# Patient Record
Sex: MALE | Race: WHITE | HISPANIC OR LATINO | Employment: FULL TIME | URBAN - METROPOLITAN AREA
[De-identification: names, ages, dates, MRNs, and addresses within clinical notes are randomized per-mention and may not be internally consistent; named-entity substitution may affect disease eponyms.]

---

## 2023-01-23 NOTE — PROGRESS NOTES
Name: Cecy Villeda      : 1985      MRN: [id number]  Encounter Provider: JEN Wong  Encounter Date: 2023   Encounter department: [facility]     1  Overweight (BMI 25 0-29  9)  Assessment & Plan:  BMI Counseling: Body mass index is 29 kg/m²  The BMI is above normal  Nutrition recommendations include reducing portion sizes, decreasing overall calorie intake, 3-5 servings of fruits/vegetables daily, reducing fast food intake, consuming healthier snacks, decreasing soda and/or juice intake, moderation in carbohydrate intake, increasing intake of lean protein, reducing intake of saturated fat and trans fat and reducing intake of cholesterol  Exercise recommendations include moderate aerobic physical activity for 150 minutes/week  Orders:  -     Hemoglobin A1C; Future  -     Basic metabolic panel; Future  -     Lipid Panel with Direct LDL reflex; Future    2  Encounter for medical examination to establish care    3  Screening examination for STD (sexually transmitted disease)  -     Trichomonas Vaginalis, [facility]; Future  -     RPR; Future  -     Chlamydia/GC amplified DNA by PCR; Future  -     HIV 1/2 Antigen/Antibody (4th Generation) w Reflex [facility]; Future  -     Hepatitis panel, acute; Future    4  Skin tags, multiple acquired  Assessment & Plan:  - RTC for cryotherapy        Depression Screening and Follow-up Plan: Patient was screened for depression during today's encounter  They screened negative with a PHQ-2 score of 0  Subjective      Cecy Villeda is a 30 y o  male  has no past medical history on file  has no past surgical history on file  Pt presents today to establish care  He endorses 3 skin tags, present for 5-7 months and slowly growing  He has not tried anything for them  Review of Systems   Constitutional: Negative for chills and fever  HENT: Negative for ear pain and sore throat      Eyes: Negative for pain and visual disturbance  Respiratory: Negative for cough and shortness of breath  Cardiovascular: Negative for chest pain and palpitations  Gastrointestinal: Negative for abdominal pain and vomiting  Genitourinary: Negative for dysuria and hematuria  Musculoskeletal: Negative for arthralgias and back pain  Skin: Negative for color change and rash  (+) skin tags     Neurological: Negative for seizures and syncope  All other systems reviewed and are negative  No current outpatient medications on file prior to visit  Objective     /86 (BP Location: Left arm, Patient Position: Sitting, Cuff Size: Large)   Pulse 72   Temp 97 8 °F (36 6 °C) (Temporal)   Resp 18   Ht 6' 3" (1 905 m)   Wt 105 kg (232 lb)   SpO2 98%   BMI 29 00 kg/m²     Physical Exam  Vitals and nursing note reviewed  Constitutional:       General: He is not in acute distress  Appearance: He is not ill-appearing  HENT:      Head: Normocephalic and atraumatic  Right Ear: External ear normal  There is no impacted cerumen  Left Ear: External ear normal  There is no impacted cerumen  Nose: Nose normal  No congestion  Eyes:      Pupils: Pupils are equal, round, and reactive to light  Cardiovascular:      Rate and Rhythm: Normal rate and regular rhythm  Pulses: Normal pulses  Heart sounds: Normal heart sounds  No murmur heard  Pulmonary:      Effort: Pulmonary effort is normal       Breath sounds: Normal breath sounds  Abdominal:      General: Bowel sounds are normal       Palpations: Abdomen is soft  Tenderness: There is no abdominal tenderness  Musculoskeletal:         General: No tenderness  Normal range of motion  Cervical back: Normal range of motion  No tenderness  Skin:     General: Skin is warm and dry  Neurological:      General: No focal deficit present  Mental Status: He is alert and oriented to person, place, and time  Psychiatric:         Mood and Affect: Mood normal          Behavior: Behavior normal          Thought Content:  Thought content normal          Judgment: Judgment normal        Lisy Hernández

## 2023-01-24 ENCOUNTER — APPOINTMENT (OUTPATIENT)
Dept: LAB | Facility: CLINIC | Age: 38
End: 2023-01-24

## 2023-01-24 ENCOUNTER — OFFICE VISIT (OUTPATIENT)
Dept: FAMILY MEDICINE CLINIC | Facility: CLINIC | Age: 38
End: 2023-01-24

## 2023-01-24 VITALS
RESPIRATION RATE: 18 BRPM | HEIGHT: 75 IN | WEIGHT: 232 LBS | SYSTOLIC BLOOD PRESSURE: 132 MMHG | HEART RATE: 72 BPM | BODY MASS INDEX: 28.85 KG/M2 | OXYGEN SATURATION: 98 % | TEMPERATURE: 97.8 F | DIASTOLIC BLOOD PRESSURE: 86 MMHG

## 2023-01-24 DIAGNOSIS — E66.3 OVERWEIGHT (BMI 25.0-29.9): Primary | ICD-10-CM

## 2023-01-24 DIAGNOSIS — Z11.3 SCREENING EXAMINATION FOR STD (SEXUALLY TRANSMITTED DISEASE): ICD-10-CM

## 2023-01-24 DIAGNOSIS — E66.3 OVERWEIGHT (BMI 25.0-29.9): ICD-10-CM

## 2023-01-24 DIAGNOSIS — Z00.00 ENCOUNTER FOR MEDICAL EXAMINATION TO ESTABLISH CARE: ICD-10-CM

## 2023-01-24 DIAGNOSIS — L91.8 SKIN TAGS, MULTIPLE ACQUIRED: ICD-10-CM

## 2023-01-24 LAB
ANION GAP SERPL CALCULATED.3IONS-SCNC: 4 MMOL/L (ref 4–13)
BUN SERPL-MCNC: 16 MG/DL (ref 5–25)
CALCIUM SERPL-MCNC: 9.7 MG/DL (ref 8.3–10.1)
CHLORIDE SERPL-SCNC: 105 MMOL/L (ref 96–108)
CHOLEST SERPL-MCNC: 150 MG/DL
CO2 SERPL-SCNC: 28 MMOL/L (ref 21–32)
CREAT SERPL-MCNC: 1.16 MG/DL (ref 0.6–1.3)
GFR SERPL CREATININE-BSD FRML MDRD: 81 ML/MIN/1.73SQ M
GLUCOSE P FAST SERPL-MCNC: 78 MG/DL (ref 65–99)
HDLC SERPL-MCNC: 56 MG/DL
LDLC SERPL CALC-MCNC: 79 MG/DL (ref 0–100)
POTASSIUM SERPL-SCNC: 4.2 MMOL/L (ref 3.5–5.3)
SODIUM SERPL-SCNC: 137 MMOL/L (ref 135–147)
TRIGL SERPL-MCNC: 74 MG/DL

## 2023-01-24 NOTE — ASSESSMENT & PLAN NOTE
- RTC for cryotherapy Spoke with patient, let him know he was cleared for SCS from behavioral health. Also let him know that I would touch base with Dr Gibbs about medical clearance.  Pt scheduled for T-Spine MRI on 4/24/19- he will drop off the disk and the report after it is done for MD to look at.

## 2023-01-24 NOTE — ASSESSMENT & PLAN NOTE
BMI Counseling: Body mass index is 29 kg/m²  The BMI is above normal  Nutrition recommendations include reducing portion sizes, decreasing overall calorie intake, 3-5 servings of fruits/vegetables daily, reducing fast food intake, consuming healthier snacks, decreasing soda and/or juice intake, moderation in carbohydrate intake, increasing intake of lean protein, reducing intake of saturated fat and trans fat and reducing intake of cholesterol  Exercise recommendations include moderate aerobic physical activity for 150 minutes/week

## 2023-01-25 LAB
C TRACH DNA SPEC QL NAA+PROBE: NEGATIVE
EST. AVERAGE GLUCOSE BLD GHB EST-MCNC: 111 MG/DL
HAV IGM SER QL: NORMAL
HBA1C MFR BLD: 5.5 %
HBV CORE IGM SER QL: NORMAL
HBV SURFACE AG SER QL: NORMAL
HCV AB SER QL: NORMAL
HIV 1+2 AB+HIV1 P24 AG SERPL QL IA: NORMAL
HIV 2 AB SERPL QL IA: NORMAL
HIV1 AB SERPL QL IA: NORMAL
HIV1 P24 AG SERPL QL IA: NORMAL
N GONORRHOEA DNA SPEC QL NAA+PROBE: NEGATIVE
RPR SER QL: NORMAL

## 2023-01-26 LAB — T VAGINALIS RRNA SPEC QL NAA+PROBE: NEGATIVE

## 2023-02-03 ENCOUNTER — PROCEDURE VISIT (OUTPATIENT)
Dept: FAMILY MEDICINE CLINIC | Facility: CLINIC | Age: 38
End: 2023-02-03

## 2023-02-03 VITALS
RESPIRATION RATE: 16 BRPM | TEMPERATURE: 96.6 F | DIASTOLIC BLOOD PRESSURE: 78 MMHG | OXYGEN SATURATION: 97 % | HEART RATE: 72 BPM | BODY MASS INDEX: 28.97 KG/M2 | WEIGHT: 233 LBS | HEIGHT: 75 IN | SYSTOLIC BLOOD PRESSURE: 120 MMHG

## 2023-02-03 DIAGNOSIS — L91.8 SKIN TAG: ICD-10-CM

## 2023-02-03 DIAGNOSIS — L91.8 SKIN TAGS, MULTIPLE ACQUIRED: Primary | ICD-10-CM

## 2023-02-03 NOTE — PROGRESS NOTES
Name: Gera Rainey      : 1985      MRN: [id number]  Encounter Provider: Sally Pozo MD  Encounter Date: 2/3/2023   Encounter department: [place] [address]    Assessment & Plan     1  Skin tags, multiple acquired  Assessment & Plan:  Patient with 6-12 month history of skin tags that are irritating and bothersome  Removal of 4 skin tags preformed in clinic today  Patient tolerated procedure well  Will send for pathology  Orders:  -     Lesion Destruction    2  Skin tag  -     Tissue Exam  -     Tissue Exam         Subjective     This is a very pleasant 30 y o  male who presents to the clinic for removal of multiple skin tags  Patient reports he has had the skin tags for over 6 months and that he is bothered by both the appearance and tendency of the skin tags to snag on clothing and become inflamed  Patient denies any rashes or fever  Review of Systems   Constitutional: Negative for chills and fever  HENT: Negative for congestion, rhinorrhea and sinus pressure  Respiratory: Negative for chest tightness, shortness of breath and wheezing  Cardiovascular: Negative for chest pain and palpitations  Gastrointestinal: Negative for abdominal pain, nausea and vomiting  Endocrine: Negative for polyuria  Genitourinary: Negative for difficulty urinating, dysuria, frequency and urgency  Musculoskeletal: Negative for myalgias  Skin:        Multiple skin tags   Neurological: Negative for dizziness, syncope and light-headedness  Psychiatric/Behavioral: Negative for dysphoric mood  No past medical history on file  No past surgical history on file  No family history on file    Social History     Socioeconomic History   • Marital status: Single     Spouse name: None   • Number of children: None   • Years of education: None   • Highest education level: None   Occupational History   • None   Tobacco Use   • Smoking status: Former     Types: Cigarettes   • Smokeless tobacco: Never   Substance and Sexual Activity   • Alcohol use: Yes     Comment: social   • Drug use: Never   • Sexual activity: None   Other Topics Concern   • None   Social History Narrative   • None     Social Determinants of Health     Financial Resource Strain: Low Risk    • Difficulty of Paying Living Expenses: Not very hard   Food Insecurity: No Food Insecurity   • Worried About Running Out of Food in the Last Year: Never true   • Ran Out of Food in the Last Year: Never true   Transportation Needs: No Transportation Needs   • Lack of Transportation (Medical): No   • Lack of Transportation (Non-Medical): No   Physical Activity: Not on file   Stress: Not on file   Social Connections: Not on file   Intimate Partner Violence: Not on file   Housing Stability: Not on file     No current outpatient medications on file prior to visit  Allergies   Allergen Reactions   • Clindamycin Rash     Immunization History   Administered Date(s) Administered   • COVID-19 Pfizer vac (Sly-sucrose, gray cap) 12 yr+ IM 01/22/2022, 02/12/2022       Objective     /78 (BP Location: Left arm, Patient Position: Sitting, Cuff Size: Large)   Pulse 72   Temp (!) 96 6 °F (35 9 °C) (Temporal)   Resp 16   Ht 6' 3" (1 905 m)   Wt 106 kg (233 lb)   SpO2 97%   BMI 29 12 kg/m²       Physical Exam  Constitutional:       Appearance: Normal appearance  HENT:      Head: Normocephalic and atraumatic  Eyes:      Conjunctiva/sclera: Conjunctivae normal    Cardiovascular:      Rate and Rhythm: Normal rate and regular rhythm  Pulses: Normal pulses  Heart sounds: Normal heart sounds  Pulmonary:      Effort: Pulmonary effort is normal       Breath sounds: Normal breath sounds  Abdominal:      General: There is no distension  Musculoskeletal:         General: Normal range of motion  Cervical back: Normal range of motion and neck supple     Skin:     Findings: Lesion (multiple skin tags noted on LL abdominal quadrant, left inferior deltoid, left superior teres major, and serratus posterior ) present  Neurological:      Mental Status: He is alert and oriented to person, place, and time  Psychiatric:         Behavior: Behavior normal          Thought Content: Thought content normal        Lesion Destruction    Date/Time: 2/4/2023 3:50 PM  Performed by: Sally Pozo MD  Authorized by: Sally Pozo MD   Universal Protocol:  Procedure performed by:  Consent: Verbal consent obtained  Risks and benefits: risks, benefits and alternatives were discussed  Consent given by: patient  Time out: Immediately prior to procedure a "time out" was called to verify the correct patient, procedure, equipment, support staff and site/side marked as required    Timeout called at: 2/3/2023 11:00 AM   Patient understanding: patient states understanding of the procedure being performed  Patient consent: the patient's understanding of the procedure matches consent given  Procedure consent: procedure consent matches procedure scheduled  Relevant documents: relevant documents present and verified  Patient identity confirmed: verbally with patient      Procedure Details - Lesion Destruction:     Number of Lesions:  4  Lesion 1:     Body area:  Trunk    Trunk location:  Abdomen    Initial size (mm):  4 (4)    Malignancy: benign lesion      Destruction method: electrodesiccation and curettage      Destruction method comment:  Scissors and forceps used for extraction  Lesion 2:     Body area:  Trunk    Trunk location:  Back    Initial size (mm):  5    Malignancy: benign lesion      Destruction method: electrodesiccation and curettage      Destruction method comment:  Scissors and forceps used for extraction   Lesion 3:     Body area:  Trunk    Trunk location:  Back    Initial size (mm):  2    Malignancy: benign lesion      Destruction method: electrodesiccation and curettage      Destruction method comment:  Scissors and forceps used for extraction  Lesion 4:     Body area:  Trunk    Trunk location:  Back    Initial size (mm):  2    Malignancy: benign lesion      Destruction method: electrodesiccation and curettage      Destruction method comment:  Scissors and forceps used for extraction                      Robert Cueto MD

## 2023-02-04 NOTE — ASSESSMENT & PLAN NOTE
Patient with 6-12 month history of skin tags that are irritating and bothersome  Removal of 4 skin tags preformed in clinic today  Patient tolerated procedure well  Will send for pathology

## 2023-03-28 ENCOUNTER — TELEPHONE (OUTPATIENT)
Dept: UROLOGY | Facility: AMBULATORY SURGERY CENTER | Age: 38
End: 2023-03-28

## 2023-03-28 ENCOUNTER — OFFICE VISIT (OUTPATIENT)
Dept: FAMILY MEDICINE CLINIC | Facility: CLINIC | Age: 38
End: 2023-03-28

## 2023-03-28 VITALS
TEMPERATURE: 96.7 F | HEIGHT: 75 IN | DIASTOLIC BLOOD PRESSURE: 74 MMHG | HEART RATE: 62 BPM | RESPIRATION RATE: 19 BRPM | OXYGEN SATURATION: 98 % | WEIGHT: 230 LBS | BODY MASS INDEX: 28.6 KG/M2 | SYSTOLIC BLOOD PRESSURE: 134 MMHG

## 2023-03-28 DIAGNOSIS — Z48.816 ENCOUNTER FOR ASSESSMENT OF CIRCUMCISION: ICD-10-CM

## 2023-03-28 DIAGNOSIS — E66.3 OVERWEIGHT (BMI 25.0-29.9): ICD-10-CM

## 2023-03-28 DIAGNOSIS — M54.50 CHRONIC MIDLINE LOW BACK PAIN WITHOUT SCIATICA: Primary | ICD-10-CM

## 2023-03-28 DIAGNOSIS — G89.29 CHRONIC MIDLINE LOW BACK PAIN WITHOUT SCIATICA: Primary | ICD-10-CM

## 2023-03-28 RX ORDER — NAPROXEN 500 MG/1
250 TABLET ORAL 2 TIMES DAILY WITH MEALS
Qty: 90 TABLET | Refills: 0 | Status: CANCELLED | OUTPATIENT
Start: 2023-03-28

## 2023-03-28 RX ORDER — LIDOCAINE 50 MG/G
OINTMENT TOPICAL AS NEEDED
Qty: 35.44 G | Refills: 0 | Status: SHIPPED | OUTPATIENT
Start: 2023-03-28

## 2023-03-28 RX ORDER — SENNOSIDES 8.6 MG
650 CAPSULE ORAL EVERY 8 HOURS PRN
Qty: 30 TABLET | Refills: 0 | Status: SHIPPED | OUTPATIENT
Start: 2023-03-28

## 2023-03-28 NOTE — TELEPHONE ENCOUNTER
Please Triage  New Patient    What is the reason for the patient’s appointment? referral Z41 2 (ICD-10-CM) - Encounter for circumcision    What office location does the patient prefer? [place]     Imaging/Lab Results: no    Do we accept the patient's insurance or is the patient Self-Pay? Insurance Provider: [employer]  Plan Type/Number:  Member ID#: Has the patient had any previous Urologist(s)? no    Have patient records been requested? If not are records showing in Epic: no    Has the patient had any outside testing done? no    Does the patient have a personal history of cancer?  no

## 2023-03-28 NOTE — ASSESSMENT & PLAN NOTE
Body mass index is 28 75 kg/m²  The BMI is above normal  Nutrition recommendations include reducing portion sizes, decreasing overall calorie intake, 3-5 servings of fruits/vegetables daily, reducing fast food intake, consuming healthier snacks, decreasing soda and/or juice intake, moderation in carbohydrate intake, increasing intake of lean protein, reducing intake of saturated fat and trans fat and reducing intake of cholesterol  Exercise recommendations include moderate aerobic physical activity for 150 minutes/week

## 2023-03-28 NOTE — PROGRESS NOTES
Name: Elva Sanchez      : 1985      MRN: [id number]  Encounter Provider: JEN Hudson  Encounter Date: 3/28/2023   Encounter department: [address] [place] [address]     1  Chronic midline low back pain without sciatica  Assessment & Plan:  - Recommend PT, heat/ice application, home exercises  - May use tylenol 500 mg prn   - Advised pt to use naproxen prn if tylenol is ineffective  Orders:  -     Ambulatory Referral to Physical Therapy; Future  -     lidocaine (XYLOCAINE) 5 % ointment; Apply topically as needed for mild pain  -     acetaminophen (TYLENOL) 650 mg CR tablet; Take 1 tablet (650 mg total) by mouth every 8 (eight) hours as needed for mild pain    2  Encounter for assessment of circumcision  -     Ambulatory Referral to Urology; Future    3  Overweight (BMI 25 0-29  9)  Assessment & Plan: Body mass index is 28 75 kg/m²  The BMI is above normal  Nutrition recommendations include reducing portion sizes, decreasing overall calorie intake, 3-5 servings of fruits/vegetables daily, reducing fast food intake, consuming healthier snacks, decreasing soda and/or juice intake, moderation in carbohydrate intake, increasing intake of lean protein, reducing intake of saturated fat and trans fat and reducing intake of cholesterol  Exercise recommendations include moderate aerobic physical activity for 150 minutes/week  Subjective      Elva Sanchez is a 30 y o  male  has no past medical history on file  has no past surgical history on file  Patient presents for evaluation of low back problems  Patient reports taking robaxin PRN in the past which helped with the pain  Patient also states seeing physical therapy and have tried robaxin in the past for the same problem which alleviates with the pain  Symptoms have been present for 6 months but reports worsening in the past week  Pain is sharp in character; 9/10 in severity  "Symptoms are worse in the morning  Aggravating factors identifiable by the patient are walking, bending backwards, bending forwards and bending sideways  Allevating factors identifiable by the patient are sitting and standing  Patient denies urinary and gastrointestinal problems, fever, numbness/weakness on bilateral lower extremity  Patient is requesting referral for urology for circumcision  Review of Systems   Constitutional: Negative for chills and fever  HENT: Negative for ear pain and sore throat  Eyes: Negative for pain and visual disturbance  Respiratory: Negative for cough and shortness of breath  Cardiovascular: Negative for chest pain and palpitations  Gastrointestinal: Negative for abdominal pain and vomiting  Endocrine: Negative  Genitourinary: Negative for dysuria and hematuria  Musculoskeletal: Positive for arthralgias and back pain  Skin: Negative for color change and rash  Neurological: Negative for seizures and syncope  Psychiatric/Behavioral: Negative  All other systems reviewed and are negative  No current outpatient medications on file prior to visit  Objective     /74 (BP Location: Right arm, Patient Position: Sitting, Cuff Size: Large)   Pulse 62   Temp (!) 96 7 °F (35 9 °C) (Temporal)   Resp 19   Ht 6' 3\" (1 905 m)   Wt 104 kg (230 lb)   SpO2 98%   BMI 28 75 kg/m²     Physical Exam  Constitutional:       Appearance: Normal appearance  He is normal weight  HENT:      Head: Normocephalic and atraumatic  Right Ear: Tympanic membrane, ear canal and external ear normal       Left Ear: Tympanic membrane, ear canal and external ear normal       Nose: Nose normal       Mouth/Throat:      Mouth: Mucous membranes are moist    Eyes:      General:         Right eye: No discharge  Left eye: No discharge  Cardiovascular:      Rate and Rhythm: Normal rate and regular rhythm  Pulses: Normal pulses        Heart sounds: Normal " heart sounds  Pulmonary:      Effort: Pulmonary effort is normal       Breath sounds: Normal breath sounds  Abdominal:      General: Abdomen is flat  Bowel sounds are normal    Musculoskeletal:         General: Normal range of motion  Cervical back: Normal range of motion and neck supple  Lumbar back: Tenderness present  Right lower leg: No edema  Left lower leg: No edema  Skin:     General: Skin is warm and dry  Neurological:      General: No focal deficit present  Mental Status: He is alert and oriented to person, place, and time  Mental status is at baseline  Psychiatric:         Mood and Affect: Mood normal          Behavior: Behavior normal          Thought Content:  Thought content normal          Judgment: Judgment normal        Zina Mireles

## 2023-04-25 ENCOUNTER — OFFICE VISIT (OUTPATIENT)
Dept: UROLOGY | Facility: CLINIC | Age: 38
End: 2023-04-25

## 2023-04-25 VITALS
WEIGHT: 219 LBS | HEIGHT: 75 IN | HEART RATE: 60 BPM | DIASTOLIC BLOOD PRESSURE: 78 MMHG | BODY MASS INDEX: 27.23 KG/M2 | SYSTOLIC BLOOD PRESSURE: 120 MMHG

## 2023-04-25 DIAGNOSIS — Z48.816 ENCOUNTER FOR ASSESSMENT OF CIRCUMCISION: ICD-10-CM

## 2023-04-25 NOTE — PROGRESS NOTES
Assessment and plan:     Encounter for assessment of circumcision  · Mild phimosis  · OR Case requested  · Surgical consent obtained       JEN Babcock    History of Present Illness     Anup Armijo is a 41 y o  new patient who presents to have a circumcision  Denies any difficulty with pulling his foreskin back  He is doing this for personal reasons  Denies urinary issues  No testicular pain/swelling  Denies UTI/STI hx     Laboratory     Lab Results   Component Value Date    BUN 16 01/24/2023    CREATININE 1 16 01/24/2023       No components found for: GFR    Lab Results   Component Value Date    CALCIUM 9 7 01/24/2023    K 4 2 01/24/2023    CO2 28 01/24/2023     01/24/2023       No results found for: WBC, HGB, HCT, MCV, PLT    No results found for: PSA    No results found for this or any previous visit (from the past 1 hour(s))  @RESULT(URINEMICROSCOPIC)@    @RESULT(URINECULTURE)@    Radiology       Review of Systems     Review of Systems   Constitutional: Negative for activity change, appetite change, chills, fatigue, fever and unexpected weight change  HENT: Negative for facial swelling  Eyes: Negative for discharge  Respiratory: Negative  Negative for cough and shortness of breath  Cardiovascular: Negative for chest pain and leg swelling  Gastrointestinal: Negative  Negative for abdominal distention, abdominal pain, constipation, diarrhea, nausea and vomiting  Endocrine: Negative  Genitourinary: Negative  Negative for decreased urine volume, difficulty urinating, dysuria, enuresis, flank pain, frequency, genital sores, hematuria, penile pain, penile swelling, scrotal swelling, testicular pain and urgency  Musculoskeletal: Negative for back pain and myalgias  Skin: Negative for pallor and rash  Allergic/Immunologic: Negative  Negative for immunocompromised state  Neurological: Negative for facial asymmetry and speech difficulty  Psychiatric/Behavioral: Negative for agitation and confusion  Allergies     Allergies   Allergen Reactions   • Clindamycin Rash       Physical Exam     Physical Exam  Vitals reviewed  Constitutional:       General: He is not in acute distress  Appearance: Normal appearance  He is normal weight  He is not ill-appearing, toxic-appearing or diaphoretic  HENT:      Head: Normocephalic and atraumatic  Eyes:      General: No scleral icterus  Cardiovascular:      Rate and Rhythm: Normal rate and regular rhythm  Pulmonary:      Effort: Pulmonary effort is normal  No respiratory distress  Abdominal:      General: Abdomen is flat  There is no distension  Palpations: Abdomen is soft  Tenderness: There is no abdominal tenderness  There is no guarding or rebound  Genitourinary:     Penis: Uncircumcised  Phimosis present  No hypospadias, erythema, tenderness, discharge, swelling or lesions  Testes:         Right: Mass, tenderness or swelling not present  Right testis is descended  Left: Mass, tenderness or swelling not present  Left testis is descended  Epididymis:      Right: Not inflamed  No tenderness  Left: Not inflamed  No tenderness  Musculoskeletal:         General: No swelling  Cervical back: Normal range of motion  Right lower leg: No edema  Left lower leg: No edema  Lymphadenopathy:      Lower Body: No right inguinal adenopathy  No left inguinal adenopathy  Skin:     General: Skin is warm and dry  Coloration: Skin is not jaundiced or pale  Findings: No rash  Neurological:      General: No focal deficit present  Mental Status: He is alert and oriented to person, place, and time  Gait: Gait normal    Psychiatric:         Mood and Affect: Mood normal          Behavior: Behavior normal          Thought Content:  Thought content normal          Judgment: Judgment normal          Vital Signs     Vitals:    04/25/23 0743   BP: "120/78   BP Location: Left arm   Patient Position: Sitting   Cuff Size: Adult   Pulse: 60   Weight: 99 3 kg (219 lb)   Height: 6' 3\" (1 905 m)       Current Medications       Current Outpatient Medications:   •  acetaminophen (TYLENOL) 650 mg CR tablet, Take 1 tablet (650 mg total) by mouth every 8 (eight) hours as needed for mild pain, Disp: 30 tablet, Rfl: 0  •  lidocaine (XYLOCAINE) 5 % ointment, Apply topically as needed for mild pain, Disp: 35 44 g, Rfl: 0    Active Problems     Patient Active Problem List   Diagnosis   • Overweight (BMI 25 0-29  9)   • Skin tags, multiple acquired   • Chronic midline low back pain without sciatica   • Encounter for assessment of circumcision       Past Medical History     History reviewed  No pertinent past medical history  Surgical History     History reviewed  No pertinent surgical history  Family History     History reviewed  No pertinent family history  Social History     Social History     Social History     Tobacco Use   Smoking Status Former   • Types: Cigarettes   Smokeless Tobacco Never       History reviewed  No pertinent surgical history  The following portions of the patient's history were reviewed and updated as appropriate: allergies, current medications, past family history, past medical history, past social history, past surgical history and problem list    Please note :  Voice dictation software has been used to create this document  There may be inadvertent transcription errors      Brittney Babin"

## 2023-04-25 NOTE — PATIENT INSTRUCTIONS
Circuncisión masculina en adultos   LO QUE NECESITA SABER:   La circuncisión es un procedimiento para quitar el prepucio del pene  El prepucio es el pliegue de piel que cubre la punta del pene  Dias dolor no mejora en un periodo de 3 días  Sin embargo, dias pene puede estar inflamado maykel [employer]  Dominga Mike [facility]:   Llame a dias médico si:  Tiene fiebre  Hay nuevo sangrado  El dolor se agrava o no mejora en pocos fátima  Tiene dificultad para orinar  Se desprenden los puntos de sutura  Usted tiene preguntas o inquietudes acerca de dias condición o cuidado  Medicamentos:  MAXIMUS pueden disminuir la inflamación y el dolor o la fiebre  Felix medicamento está disponible con o sin sanya receta médica  Los MAXIMUS pueden causar sangrado estomacal o problemas renales en ciertas personas  Si usted delvis un medicamento anticoagulante, siempre pregúntele a dias médico si los MAXIMUS son seguros para usted  Siempre elodia la etiqueta de felix medicamento y [place] instrucciones  Acetaminofén [employer] dolor y baja la fiebre  Está disponible sin receta médica  Pregunte la cantidad y la frecuencia con que debe tomarlos  [address]  Elodia las etiquetas de todos los demás medicamentos que esté usando para saber si también contienen acetaminofén, o pregunte a dias médico o farmacéutico  El acetaminofén puede causar daño en el hígado cuando no se delvis de forma correcta  [place] david medicamentos yamilet se le haya indicado  Consulte con dias médico si usted john que dias medicamento no le está ayudando o si presenta efectos secundarios  Infórmele al médico si usted es alérgico a algún medicamento  Mantenga sanya lista actualizada de los [place], las vitaminas y los productos herbales que delvis  Incluya los siguientes datos de los medicamentos: cantidad, frecuencia y motivo de administración  Traiga con usted la lista o los envases de las píldoras a david citas de seguimiento   Lleve la [employer] de los medicamentos con usted en manuel de sanya emergencia  Cuidado del vendaje de la circuncisión: Retire el vendaje de dias pene mojándolo en la bañera  Llene sanya bañera limpia por sobre la altura de la cadera con agua limpia tibia  Siéntese en la bañera hasta que el vendaje se salga fácilmente  Cuidado personal: Puede tardar 6 semanas o más para que se cure completamente  Coloque hielo en la jay  Se debe colocar hielo maykel 20 minutos y luego quitarlo otros 20 minutos  Repita esto para las primeras 24 horas después de dias procedimiento  Use sanya compresa de hielo o ponga hielo triturado en sanya bolsa de plástico  Auther [employer]ding con sanya toalla antes de aplicarlo sobre la jay  El hielo ayudará a aliviar el dolor y bajar la inflamación  Descanse  No corra ni practique deportes hasta que dias médico se lo autorice  No tenga relaciones sexuales maykel 6 semanas, o hasta que dias médico lo autorice  Acuda a david consultas de control con dias médico según le indicaron  Anote david preguntas para que se acuerde de hacerlas maykel david visitas  © Copyright Robyne Russian 2022 Information is for End User's use only and may not be sold, redistributed or otherwise used for commercial purposes  Esta información es sólo para uso en educación  Dias intención no es darle un consejo médico sobre enfermedades o tratamientos  Colsulte con dias Earnesteen Sergeant farmacéutico antes de seguir cualquier régimen médico para saber si es seguro y efectivo para usted  Circuncisión masculina en adultos   LO QUE NECESITA SABER:   ¿Qué necesito saber acerca de sanya circuncisión? La circuncisión es un procedimiento para quitar el prepucio del pene  El prepucio es el pliegue de piel que cubre la punta del pene  ¿Cómo me preparo para sanya circuncisión? Dias médico le indicará cómo prepararse para felix procedimiento  Rosezetta Banco qué medicamentos puede armand y cuáles no debe armand antes del procedimiento   Es posible que le indiquen que no coma ni tome nada pasada la medianoche de la noche previa al procedimiento  [place] sanya ducha la mañana de dias procedimiento  No se ponga en loción  [place] a alguien que lo lleve a casa y se quede con usted por un día después de dias procedimiento  ¿Qué sucederá maykel el procedimiento? Dias médico le dará sanya inyección de anestésico en la base del pene  Dias médico puede darle otra inyección más arriba en dias pene  El medicamento evitará que usted sienta dolor maykel el procedimiento, caroline estará despierto  O también es posible que le administren anestesia general para mantenerlo dormido y sin dolor maykel el procedimiento  El médico hará sanya incisión y cortará el prepucio  Dias médico puede cerrar los bordes con pegamento para tejidos o con puntos de sutura que se disolverán  Se colocará vaselina y un vendaje en el área  ¿Qué sucederá después del procedimiento? Será monitoreado hasta que esté estable  Cuando esté estable, podrá regresar a dias hogar  Usted necesitará descansar maykel el claude del día  Dias pene estará inflamado y amoratado  Tendrá algo de dolor después de que desaparezca el efecto de la anestesia  [employer] debe mejorar en los siguientes 2 o 3 fátima  ¿Cuáles son los riesgos de la circuncisión? Usted podría sangrar más de lo esperado o contraer sanya infección  Podría ocurrir sanya [employer]  pene, la uretra o los nervios  Es posible que usted necesite otro procedimiento para arreglar la lesión  ACUERDOS SOBRE DIAS CUIDADO:   Usted tiene el derecho de ayudar a planear dias cuidado  Aprenda todo lo que pueda sobre dias condición y yamilet darle tratamiento  Discuta david opciones de tratamiento con david médicos para decidir el cuidado que usted desea recibir  Usted siempre tiene el derecho de rechazar el tratamiento  Esta información es sólo para uso en educación  Dias intención no es darle un consejo médico sobre enfermedades o tratamientos   Colsulte con dias Jackie Pace farmacéutico antes de seguir cualquier régimen médico para saber si es seguro y efectivo para usted  © Copyright Elizebe Glenna 2022 Information is for End User's use only and may not be sold, redistributed or otherwise used for commercial purposes

## 2023-05-05 ENCOUNTER — TELEPHONE (OUTPATIENT)
Dept: UROLOGY | Facility: CLINIC | Age: 38
End: 2023-05-05

## 2023-05-05 ENCOUNTER — PREP FOR PROCEDURE (OUTPATIENT)
Dept: UROLOGY | Facility: CLINIC | Age: 38
End: 2023-05-05

## 2023-05-05 DIAGNOSIS — Z01.818 ENCOUNTER FOR PREADMISSION TESTING: Primary | ICD-10-CM

## 2023-05-05 NOTE — TELEPHONE ENCOUNTER
PT called with  and stated 5/19/23 will actually work please call pt with  to see if date is still available     Pt call [phone]

## 2023-05-05 NOTE — TELEPHONE ENCOUNTER
With , lmom to c/b for scheduling OR procedure  Have 5/19 available with Dr Blanca Rooney on 5/19 at [facility]  Will email all instructions when pt confirms date  Pt will also need a postop appt scheduled for 2 weeks with AP

## 2023-05-05 NOTE — TELEPHONE ENCOUNTER
Returned call with   Scheduled OR for 5/19 with Dr Lyn Paniagua at [facility][1]  F/U scheduled for 5/31  Emailed all OR instructions per pt request  Mark Conrad to call office with any questions

## 2023-05-10 NOTE — H&P
H&P Exam - Urology   Esvin Tavares 41 y o  male MRN: [id number]  Unit/Bed#:  Encounter: [id number]    Assessment/Plan     Assessment:  Phimosis  Plan:  Circumcision    History of Present Illness   HPI:  Esvin Tavares is a 41 y o  male who presents with mild phimosis although the patient notes no difficulty pulling his foreskin back when he voids  The patient presented requesting elective circumcision  I met the patient in the holding area and explained the elective nature of this procedure  We discussed pros and cons of circumcision through a  and the patient is aware of the potential side effects of bleeding infection contracture dehiscence or need for other operative interventions  Meatal stenosis was also discussed  The patient agreed to the procedure and reaffirmed previously signed informed consent verbally  Review of Systems    Historical Information   No past medical history on file  No past surgical history on file  Social History   Social History     Substance and Sexual Activity   Alcohol Use Yes    Comment: social     Social History     Substance and Sexual Activity   Drug Use Never     Social History     Tobacco Use   Smoking Status Former   • Types: Cigarettes   Smokeless Tobacco Never     Family History: No family history on file  Meds/Allergies   all medications and allergies reviewed  Allergies   Allergen Reactions   • Clindamycin Rash       Objective   Vitals: There were no vitals taken for this visit  No intake/output data recorded  Invasive Devices     None                 Physical Exam  Vitals reviewed  Constitutional:       General: He is not in acute distress  Appearance: Normal appearance  He is normal weight  He is not ill-appearing, toxic-appearing or diaphoretic  HENT:      Head: Normocephalic and atraumatic        Nose: Nose normal       Mouth/Throat:      Mouth: Mucous membranes are moist    Eyes:      Extraocular Movements: Extraocular movements intact  Cardiovascular:      Rate and Rhythm: Normal rate and regular rhythm  Heart sounds: Normal heart sounds  Pulmonary:      Effort: Pulmonary effort is normal  No respiratory distress  Breath sounds: No wheezing, rhonchi or rales  Abdominal:      General: Bowel sounds are normal  There is no distension  Palpations: Abdomen is soft  Tenderness: There is no abdominal tenderness  There is no guarding  Genitourinary:     Penis: Normal        Comments: Minimal phimosis  Musculoskeletal:         General: Normal range of motion  Cervical back: Normal range of motion and neck supple  Neurological:      Mental Status: He is alert and oriented to person, place, and time  Psychiatric:         Mood and Affect: Mood normal          Behavior: Behavior normal          Thought Content: Thought content normal          Judgment: Judgment normal          Lab Results: I have personally reviewed pertinent reports  Imaging: I have personally reviewed pertinent reports  EKG, Pathology, and Other Studies: I have personally reviewed pertinent reports  VTE Prophylaxis: Sequential compression device Hiro Hopson)     Code Status: No Order  Advance Directive and Living Will:      Power of :    POLST:      Counseling / Coordination of Care  Total floor / unit time spent today 30 minutes  Greater than 50% of total time was spent with the patient and / or family counseling and / or coordination of care  A description of the counseling / coordination of care: Roscoe Herbert

## 2023-05-10 NOTE — DISCHARGE INSTRUCTIONS
Circuncisión masculina en adultos   LO QUE NECESITA SABER:   La circuncisión es un procedimiento para quitar el prepucio del pene  El prepucio es el pliegue de piel que cubre la punta del pene  Dias dolor no mejora en un periodo de 3 días  Sin embargo, dias pene puede estar inflamado maykel [employer]  [place]ur[place] Vince [facility]:   Llame a dias médico si:  Tiene fiebre  Hay nuevo sangrado  El dolor se agrava o no mejora en pocos fátima  Tiene dificultad para orinar  Se desprenden los puntos de sutura  Usted tiene preguntas o inquietudes acerca de dias condición o cuidado  Medicamentos:  MAXIMUS pueden disminuir la inflamación y el dolor o la fiebre  Shanna medicamento está disponible con o sin sanya receta médica  Los MAXIMUS pueden causar sangrado estomacal o problemas renales en ciertas personas  Si usted delvis un medicamento anticoagulante, siempre pregúntele a dias médico si los MAXIMUS son seguros para usted  Siempre elodia la etiqueta de shanna medicamento y [place] instrucciones  Acetaminofén [employer] dolor y baja la fiebre  Está disponible sin receta médica  Pregunte la cantidad y la frecuencia con que debe tomarlos  [address]  Elodia las etiquetas de todos los demás medicamentos que esté usando para saber si también contienen acetaminofén, o pregunte a dias médico o farmacéutico  El acetaminofén puede causar daño en el hígado cuando no se delvis de forma correcta  [place] david medicamentos yamilet se le haya indicado  Consulte con dias médico si usted john que dias medicamento no le está ayudando o si presenta efectos secundarios  Infórmele al médico si usted es alérgico a algún medicamento  Mantenga sanya lista actualizada de los [place], las vitaminas y los productos herbales que delvis  Incluya los siguientes datos de los medicamentos: cantidad, frecuencia y motivo de administración  Traiga con usted la lista o los envases de las píldoras a david citas de seguimiento   Lleve la [employer] de los medicamentos con usted en manuel de sanya emergencia  Cuidado del vendaje de la circuncisión: Retire el vendaje de dias pene mojándolo en la bañera  Llene sanya bañera limpia por sobre la altura de la cadera con agua limpia tibia  Siéntese en la bañera hasta que el vendaje se salga fácilmente  Cuidado personal: Puede tardar 6 semanas o más para que se cure completamente  Coloque hielo en la jay  Se debe colocar hielo maykel 20 minutos y luego quitarlo otros 20 minutos  Repita esto para las primeras 24 horas después de dias procedimiento  Use sanya compresa de hielo o ponga hielo triturado en sanya bolsa de plástico  Assumption Manger con sanya toalla antes de aplicarlo sobre la jay  El hielo ayudará a aliviar el dolor y bajar la inflamación  Descanse  No corra ni practique deportes hasta que dias médico se lo autorice  No tenga relaciones sexuales maykel 6 semanas, o hasta que dias médico lo autorice  Acuda a david consultas de control con dias médico según le indicaron  Anote david preguntas para que se acuerde de hacerlas maykel david visitas  © Copyright Winsome Liv 2022 Information is for End User's use only and may not be sold, redistributed or otherwise used for commercial purposes  Esta información es sólo para uso en educación  Dias intención no es darle un consejo médico sobre enfermedades o tratamientos  Colsulte con dias Addison Sender farmacéutico antes de seguir cualquier régimen médico para saber si es seguro y efectivo para usted

## 2023-05-12 ENCOUNTER — APPOINTMENT (OUTPATIENT)
Dept: LAB | Facility: HOSPITAL | Age: 38
End: 2023-05-12

## 2023-05-12 DIAGNOSIS — Z01.818 ENCOUNTER FOR PREADMISSION TESTING: ICD-10-CM

## 2023-05-12 LAB
ANION GAP SERPL CALCULATED.3IONS-SCNC: 6 MMOL/L (ref 4–13)
BASOPHILS # BLD AUTO: 0.07 THOUSANDS/ÂΜL (ref 0–0.1)
BASOPHILS NFR BLD AUTO: 1 % (ref 0–1)
BUN SERPL-MCNC: 19 MG/DL (ref 5–25)
CALCIUM SERPL-MCNC: 9.1 MG/DL (ref 8.4–10.2)
CHLORIDE SERPL-SCNC: 106 MMOL/L (ref 96–108)
CO2 SERPL-SCNC: 27 MMOL/L (ref 21–32)
CREAT SERPL-MCNC: 1.08 MG/DL (ref 0.6–1.3)
EOSINOPHIL # BLD AUTO: 0.26 THOUSAND/ÂΜL (ref 0–0.61)
EOSINOPHIL NFR BLD AUTO: 3 % (ref 0–6)
ERYTHROCYTE [DISTWIDTH] IN BLOOD BY AUTOMATED COUNT: 12.7 % (ref 11.6–15.1)
GFR SERPL CREATININE-BSD FRML MDRD: 87 ML/MIN/1.73SQ M
GLUCOSE P FAST SERPL-MCNC: 78 MG/DL (ref 65–99)
HCT VFR BLD AUTO: 46.9 % (ref 36.5–49.3)
HGB BLD-MCNC: 15.2 G/DL (ref 12–17)
IMM GRANULOCYTES # BLD AUTO: 0.03 THOUSAND/UL (ref 0–0.2)
IMM GRANULOCYTES NFR BLD AUTO: 0 % (ref 0–2)
LYMPHOCYTES # BLD AUTO: 3.4 THOUSANDS/ÂΜL (ref 0.6–4.47)
LYMPHOCYTES NFR BLD AUTO: 37 % (ref 14–44)
MCH RBC QN AUTO: 27.8 PG (ref 26.8–34.3)
MCHC RBC AUTO-ENTMCNC: 32.4 G/DL (ref 31.4–37.4)
MCV RBC AUTO: 86 FL (ref 82–98)
MONOCYTES # BLD AUTO: 0.62 THOUSAND/ÂΜL (ref 0.17–1.22)
MONOCYTES NFR BLD AUTO: 7 % (ref 4–12)
NEUTROPHILS # BLD AUTO: 4.83 THOUSANDS/ÂΜL (ref 1.85–7.62)
NEUTS SEG NFR BLD AUTO: 52 % (ref 43–75)
NRBC BLD AUTO-RTO: 0 /100 WBCS
PLATELET # BLD AUTO: 269 THOUSANDS/UL (ref 149–390)
PMV BLD AUTO: 11.4 FL (ref 8.9–12.7)
POTASSIUM SERPL-SCNC: 3.9 MMOL/L (ref 3.5–5.3)
RBC # BLD AUTO: 5.47 MILLION/UL (ref 3.88–5.62)
SODIUM SERPL-SCNC: 139 MMOL/L (ref 135–147)
WBC # BLD AUTO: 9.21 THOUSAND/UL (ref 4.31–10.16)

## 2023-05-16 NOTE — PRE-PROCEDURE INSTRUCTIONS
Pre-Surgery Instructions:   Medication Instructions   • acetaminophen (TYLENOL) 650 mg CR tablet PRN      Medication instructions for day surgery reviewed  Please use only a sip of water to take your instructed medications  Avoid all over the counter vitamins, supplements and NSAIDS for one week prior to surgery per anesthesia guidelines  Tylenol is ok to take as needed  You will receive a call one business day prior to surgery with an arrival time and hospital directions  If your surgery is scheduled on a Monday, the hospital will be calling you on the Friday prior to your surgery  If you have not heard from anyone by 8pm, please call the hospital supervisor through the hospital  at [phone]  Erin Hurley [phone])  Do not eat or drink anything after midnight the night before your surgery, including candy, mints, lifesavers, or chewing gum  Do not drink alcohol 24hrs before your surgery  Try not to smoke at least 24hrs before your surgery  Follow the pre surgery showering instructions as listed in the [facility] Surgical Experience Booklet” or otherwise provided by your surgeon's office  Do not shave the surgical area 24 hours before surgery  Do not apply any lotions, creams, including makeup, cologne, deodorant, or perfumes after showering on the day of your surgery  No contact lenses, eye make-up, or artificial eyelashes  Remove nail polish, including gel polish, and any artificial, gel, or acrylic nails if possible  Remove all jewelry including rings and body piercing jewelry  Wear causal clothing that is easy to take on and off  Consider your type of surgery  Keep any valuables, jewelry, piercings at home  Please bring any specially ordered equipment (sling, braces) if indicated  Arrange for a responsible person to drive you to and from the hospital on the day of your surgery  Visitor Guidelines discussed       Call the surgeon's office with any new illnesses, exposures, or additional questions prior to surgery  Please reference your [facility] Surgical Experience Booklet” for additional information to prepare for your upcoming surgery

## 2023-05-17 ENCOUNTER — ANESTHESIA EVENT (OUTPATIENT)
Dept: PERIOP | Facility: HOSPITAL | Age: 38
End: 2023-05-17

## 2023-05-19 ENCOUNTER — HOSPITAL ENCOUNTER (OUTPATIENT)
Facility: HOSPITAL | Age: 38
Setting detail: OUTPATIENT SURGERY
Discharge: HOME | End: 2023-05-19
Attending: UROLOGY | Admitting: UROLOGY

## 2023-05-19 ENCOUNTER — ANESTHESIA (OUTPATIENT)
Dept: PERIOP | Facility: HOSPITAL | Age: 38
End: 2023-05-19

## 2023-05-19 VITALS
BODY MASS INDEX: 27.8 KG/M2 | RESPIRATION RATE: 16 BRPM | OXYGEN SATURATION: 99 % | DIASTOLIC BLOOD PRESSURE: 65 MMHG | SYSTOLIC BLOOD PRESSURE: 120 MMHG | HEART RATE: 60 BPM | WEIGHT: 222.44 LBS | TEMPERATURE: 97.7 F

## 2023-05-19 DIAGNOSIS — Z48.816 ENCOUNTER FOR ASSESSMENT OF CIRCUMCISION: Primary | ICD-10-CM

## 2023-05-19 RX ORDER — ONDANSETRON 2 MG/ML
INJECTION INTRAMUSCULAR; INTRAVENOUS AS NEEDED
Status: DISCONTINUED | OUTPATIENT
Start: 2023-05-19 | End: 2023-05-19

## 2023-05-19 RX ORDER — HYDROMORPHONE HCL/PF 1 MG/ML
0.5 SYRINGE (ML) INJECTION
Status: DISCONTINUED | OUTPATIENT
Start: 2023-05-19 | End: 2023-05-19 | Stop reason: HOSPADM

## 2023-05-19 RX ORDER — CEFAZOLIN SODIUM 2 G/50ML
2000 SOLUTION INTRAVENOUS ONCE
Status: COMPLETED | OUTPATIENT
Start: 2023-05-19 | End: 2023-05-19

## 2023-05-19 RX ORDER — GLYCOPYRROLATE 0.2 MG/ML
INJECTION INTRAMUSCULAR; INTRAVENOUS AS NEEDED
Status: DISCONTINUED | OUTPATIENT
Start: 2023-05-19 | End: 2023-05-19

## 2023-05-19 RX ORDER — PROPOFOL 10 MG/ML
INJECTION, EMULSION INTRAVENOUS AS NEEDED
Status: DISCONTINUED | OUTPATIENT
Start: 2023-05-19 | End: 2023-05-19

## 2023-05-19 RX ORDER — LIDOCAINE HYDROCHLORIDE 20 MG/ML
INJECTION, SOLUTION EPIDURAL; INFILTRATION; INTRACAUDAL; PERINEURAL AS NEEDED
Status: DISCONTINUED | OUTPATIENT
Start: 2023-05-19 | End: 2023-05-19

## 2023-05-19 RX ORDER — DEXAMETHASONE SODIUM PHOSPHATE 10 MG/ML
INJECTION, SOLUTION INTRAMUSCULAR; INTRAVENOUS AS NEEDED
Status: DISCONTINUED | OUTPATIENT
Start: 2023-05-19 | End: 2023-05-19

## 2023-05-19 RX ORDER — MIDAZOLAM HYDROCHLORIDE 2 MG/2ML
INJECTION, SOLUTION INTRAMUSCULAR; INTRAVENOUS AS NEEDED
Status: DISCONTINUED | OUTPATIENT
Start: 2023-05-19 | End: 2023-05-19

## 2023-05-19 RX ORDER — HYDROCODONE BITARTRATE AND ACETAMINOPHEN 5; 325 MG/1; MG/1
1 TABLET ORAL EVERY 6 HOURS PRN
Qty: 12 TABLET | Refills: 0 | Status: SHIPPED | OUTPATIENT
Start: 2023-05-19

## 2023-05-19 RX ORDER — MAGNESIUM HYDROXIDE 1200 MG/15ML
LIQUID ORAL AS NEEDED
Status: DISCONTINUED | OUTPATIENT
Start: 2023-05-19 | End: 2023-05-19 | Stop reason: HOSPADM

## 2023-05-19 RX ORDER — FENTANYL CITRATE 50 UG/ML
INJECTION, SOLUTION INTRAMUSCULAR; INTRAVENOUS AS NEEDED
Status: DISCONTINUED | OUTPATIENT
Start: 2023-05-19 | End: 2023-05-19

## 2023-05-19 RX ORDER — SODIUM CHLORIDE, SODIUM LACTATE, POTASSIUM CHLORIDE, CALCIUM CHLORIDE 600; 310; 30; 20 MG/100ML; MG/100ML; MG/100ML; MG/100ML
125 INJECTION, SOLUTION INTRAVENOUS CONTINUOUS
Status: DISCONTINUED | OUTPATIENT
Start: 2023-05-19 | End: 2023-05-19 | Stop reason: HOSPADM

## 2023-05-19 RX ORDER — BUPIVACAINE HYDROCHLORIDE 5 MG/ML
INJECTION, SOLUTION EPIDURAL; INTRACAUDAL AS NEEDED
Status: DISCONTINUED | OUTPATIENT
Start: 2023-05-19 | End: 2023-05-19 | Stop reason: HOSPADM

## 2023-05-19 RX ORDER — FENTANYL CITRATE/PF 50 MCG/ML
25 SYRINGE (ML) INJECTION
Status: DISCONTINUED | OUTPATIENT
Start: 2023-05-19 | End: 2023-05-19 | Stop reason: HOSPADM

## 2023-05-19 RX ORDER — HYDROCODONE BITARTRATE AND ACETAMINOPHEN 5; 325 MG/1; MG/1
1 TABLET ORAL EVERY 6 HOURS PRN
Status: DISCONTINUED | OUTPATIENT
Start: 2023-05-19 | End: 2023-05-19 | Stop reason: HOSPADM

## 2023-05-19 RX ORDER — MEPERIDINE HYDROCHLORIDE 25 MG/ML
12.5 INJECTION INTRAMUSCULAR; INTRAVENOUS; SUBCUTANEOUS
Status: DISCONTINUED | OUTPATIENT
Start: 2023-05-19 | End: 2023-05-19 | Stop reason: HOSPADM

## 2023-05-19 RX ORDER — LIDOCAINE HYDROCHLORIDE 20 MG/ML
INJECTION, SOLUTION INFILTRATION; PERINEURAL AS NEEDED
Status: DISCONTINUED | OUTPATIENT
Start: 2023-05-19 | End: 2023-05-19 | Stop reason: HOSPADM

## 2023-05-19 RX ORDER — ONDANSETRON 2 MG/ML
4 INJECTION INTRAMUSCULAR; INTRAVENOUS ONCE AS NEEDED
Status: DISCONTINUED | OUTPATIENT
Start: 2023-05-19 | End: 2023-05-19 | Stop reason: HOSPADM

## 2023-05-19 RX ORDER — KETOROLAC TROMETHAMINE 30 MG/ML
INJECTION, SOLUTION INTRAMUSCULAR; INTRAVENOUS AS NEEDED
Status: DISCONTINUED | OUTPATIENT
Start: 2023-05-19 | End: 2023-05-19

## 2023-05-19 RX ADMIN — CEFAZOLIN SODIUM 2000 MG: 2 SOLUTION INTRAVENOUS at 07:25

## 2023-05-19 RX ADMIN — SODIUM CHLORIDE, SODIUM LACTATE, POTASSIUM CHLORIDE, AND CALCIUM CHLORIDE 125 ML/HR: .6; .31; .03; .02 INJECTION, SOLUTION INTRAVENOUS at 06:32

## 2023-05-19 RX ADMIN — MIDAZOLAM 2 MG: 1 INJECTION INTRAMUSCULAR; INTRAVENOUS at 07:28

## 2023-05-19 RX ADMIN — LIDOCAINE HYDROCHLORIDE 100 MG: 20 INJECTION, SOLUTION EPIDURAL; INFILTRATION; INTRACAUDAL at 07:36

## 2023-05-19 RX ADMIN — DEXAMETHASONE SODIUM PHOSPHATE 10 MG: 10 INJECTION INTRAMUSCULAR; INTRAVENOUS at 07:40

## 2023-05-19 RX ADMIN — FENTANYL CITRATE 50 MCG: 50 INJECTION INTRAMUSCULAR; INTRAVENOUS at 07:40

## 2023-05-19 RX ADMIN — ONDANSETRON 4 MG: 2 INJECTION INTRAMUSCULAR; INTRAVENOUS at 07:53

## 2023-05-19 RX ADMIN — PROPOFOL 300 MG: 10 INJECTION, EMULSION INTRAVENOUS at 07:36

## 2023-05-19 RX ADMIN — GLYCOPYRROLATE 0.2 MG: 0.2 INJECTION INTRAMUSCULAR; INTRAVENOUS at 07:41

## 2023-05-19 RX ADMIN — FENTANYL CITRATE 50 MCG: 50 INJECTION INTRAMUSCULAR; INTRAVENOUS at 07:49

## 2023-05-19 RX ADMIN — SODIUM CHLORIDE, SODIUM LACTATE, POTASSIUM CHLORIDE, AND CALCIUM CHLORIDE: .6; .31; .03; .02 INJECTION, SOLUTION INTRAVENOUS at 08:08

## 2023-05-19 RX ADMIN — KETOROLAC TROMETHAMINE 30 MG: 30 INJECTION, SOLUTION INTRAMUSCULAR at 08:16

## 2023-05-19 NOTE — ANESTHESIA POSTPROCEDURE EVALUATION
Post-Op Assessment Note    CV Status:  Stable    Pain management: adequate     Mental Status:  Alert and awake   Hydration Status:  Euvolemic   PONV Controlled:  Controlled   Airway Patency:  Patent      Post Op Vitals Reviewed: Yes      Staff: Anesthesiologist, CRNA         No notable events documented      /78 (05/19/23 0903)    Temp (!) 97 3 °F (36 3 °C) (05/19/23 0903)    Pulse 58 (05/19/23 0903)   Resp 16 (05/19/23 0903)    SpO2 100 % (05/19/23 0903)

## 2023-05-19 NOTE — ANESTHESIA PREPROCEDURE EVALUATION
Procedure:  CIRCUMCISION ADULT (Penis)    Relevant Problems   ANESTHESIA (within normal limits)      CARDIO (within normal limits)      GI/HEPATIC (within normal limits)      MUSCULOSKELETAL   (+) Chronic midline low back pain without sciatica      NEURO/PSYCH   (+) Chronic midline low back pain without sciatica      PULMONARY (within normal limits)        Physical Exam    Airway    Mallampati score: I  TM Distance: >3 FB  Neck ROM: full     Dental       Cardiovascular  Cardiovascular exam normal    Pulmonary  Pulmonary exam normal     Other Findings        Anesthesia Plan  ASA Score- 2     Anesthesia Type- general with ASA Monitors  Additional Monitors:   Airway Plan: LMA  Plan Factors-    Chart reviewed  Existing labs reviewed  Patient summary reviewed  Induction- intravenous  Postoperative Plan-     Informed Consent- Anesthetic plan and risks discussed with patient

## 2023-05-19 NOTE — INTERVAL H&P NOTE
H&P reviewed  After examining the patient I find no changes in the patients condition since the H&P had been written      Vitals:    05/19/23 0614   BP: 115/70   Pulse: 56   Resp: 16   Temp: 98 2 °F (36 8 °C)   SpO2: 98%

## 2023-05-19 NOTE — OP NOTE
OPERATIVE REPORT  PATIENT NAME: Soraya Mccauley    :  1985  MRN: [id number]  Pt Location: [facility] OR ROOM 07    SURGERY DATE: 2023    Surgeon(s) and Role:     * Zeyad Mathews MD - Primary    Preop Diagnosis:  Phimosis    Postoperative diagnosis:    Phimosis     Procedure(s):  CIRCUMCISION ADULT    Specimen(s):  ID Type Source Tests Collected by Time Destination   1 :  Tissue Foreskin TISSUE EXAM Zeyad Mathews MD 2023 0754        Estimated Blood Loss:   Minimal    Drains:  * No LDAs found *    Anesthesia Type:   LMA anesthesia with lidocaine 2% Marcaine 0 5% local penile block    Operative Indications:     Phimosis    Operative Findings:  See operative note    Complications:   None    Procedure and Technique: I saw the patient in the holding area and reviewed the proposed procedure step-by-step answered all questions reaffirmed previously performed history and physical discussed risks and complications  The patient expressed understanding  He was taken to the operating room identified by the surgeon prepped and draped in usual sterile fashion in the supine position after general LMA anesthesia was induced and after appropriate timeout  Using lidocaine 2% with equal amounts of 0 5% Marcaine local penile block anesthesia was induced  Using a 15 scalpel blade a circumferential incision in the cutaneous aspect of the foreskin took place approximately 2 cm proximal to the coronal sulcus  At the same latitude a circumferential incision was made with a 15 scalpel blade in the mucosal aspect of the foreskin  The electrocautery was used to lyse underlying subcutaneous tissue hemostatically in order to remove the redundant distal portion of the foreskin as 1 continuous ran  After this was performed hemostasis was obtained through the electrocautery and the subcutaneous layer    Once hemostasis was obtained the 2 free edges of the foreskin were then reapproximated using simple interrupted 3-0 chromic suture  Sterile dressings were then applied and the patient was extubated and transferred to the recovery room in good condition  There were no complications  The surgical result was excellent  I was present for the entire procedure  and I was present for all critical portions of the procedure      Patient Disposition:  PACU         SIGNATURE: Olimpia Grider MD  DATE: May 19, 2023  TIME: 8:17 AM

## 2023-05-22 ENCOUNTER — TELEPHONE (OUTPATIENT)
Dept: UROLOGY | Facility: MEDICAL CENTER | Age: 38
End: 2023-05-22

## 2023-05-22 NOTE — TELEPHONE ENCOUNTER
Post Op Note    Call placed with  #[phone]    Alie Palencia is a 41 y o  male s/p Circumcision performed 5/19/23 with Dr Alexandre Newell  How would you rate your pain on a scale from 1 to 10, 10 being the worst pain ever? 0  Have you had a fever? No    Have your bowel movements been regular? Yes  Do you have any difficulty urinating? No  If the patient has an incision- do you have any redness around the incision or any drainage from the incision? Yes pt notes swelling around the incision  If the patient has a drain- are you having any issues with the drain? No drain   If the patient has a genao- are you comfortable caring for your genao? No genao  Do you have any other questions or concerns that I can address at this time? Pt is doing well  Advised that the swelling is to be expected 3 days post surgery  He is to contact the office with increased swelling, fever, chills or the inability to urinate

## 2023-05-28 ENCOUNTER — TELEPHONE (OUTPATIENT)
Dept: OTHER | Facility: OTHER | Age: 38
End: 2023-05-28

## 2023-05-28 NOTE — TELEPHONE ENCOUNTER
Patient is calling regarding cancelling an appointment      Date/Time:5/31/23 at 1045am    Patient was rescheduled: YES [x] NO []  7/27/23 at 330pm    Patient requesting call back to reschedule: YES [] NO [x]

## 2023-06-06 ENCOUNTER — OFFICE VISIT (OUTPATIENT)
Dept: UROLOGY | Facility: MEDICAL CENTER | Age: 38
End: 2023-06-06

## 2023-06-06 VITALS
HEART RATE: 59 BPM | DIASTOLIC BLOOD PRESSURE: 60 MMHG | WEIGHT: 216 LBS | BODY MASS INDEX: 26.86 KG/M2 | OXYGEN SATURATION: 98 % | HEIGHT: 75 IN | SYSTOLIC BLOOD PRESSURE: 110 MMHG

## 2023-06-06 DIAGNOSIS — N47.1 PHIMOSIS: Primary | ICD-10-CM

## 2023-06-06 PROCEDURE — 99024 POSTOP FOLLOW-UP VISIT: CPT | Performed by: UROLOGY

## 2023-06-06 NOTE — PROGRESS NOTES
Circumcision around 10 days ago  Healing well  Moderate edema of the penile shaft  Chromic sutures are still in the fall out  No infection or significant drainage    I have reassured him this is normal healing process  No further medication or treatment needed  I encouraged him not to pick at the stitches      Follow-up if any further problems develop

## 2023-07-19 ENCOUNTER — OFFICE VISIT (OUTPATIENT)
Dept: UROLOGY | Facility: MEDICAL CENTER | Age: 38
End: 2023-07-19
Payer: COMMERCIAL

## 2023-07-19 ENCOUNTER — TELEPHONE (OUTPATIENT)
Dept: UROLOGY | Facility: AMBULATORY SURGERY CENTER | Age: 38
End: 2023-07-19

## 2023-07-19 VITALS
BODY MASS INDEX: 26.86 KG/M2 | SYSTOLIC BLOOD PRESSURE: 102 MMHG | DIASTOLIC BLOOD PRESSURE: 70 MMHG | HEIGHT: 75 IN | OXYGEN SATURATION: 100 % | WEIGHT: 216 LBS | HEART RATE: 56 BPM

## 2023-07-19 DIAGNOSIS — Q55.69 CONGENITAL PENILE ADHESIONS: Primary | ICD-10-CM

## 2023-07-19 PROCEDURE — 99214 OFFICE O/P EST MOD 30 MIN: CPT | Performed by: UROLOGY

## 2023-07-19 NOTE — PROGRESS NOTES
H&P Exam - Urology   Anival Yoo 41 y.o. male MRN: [id number]  Unit/Bed#:  Encounter: [id number]    Assessment/Plan     Assessment:  Penile frenular adhesion  Plan:  Incision of penile frenulum/frenuloplasty    History of Present Illness   HPI:  Anival Yoo is a 41 y.o. male who presents with a history of phimosis and undergoing circumcision on 5/19/2023. The patient did well postoperatively with excellent healing of the circumcision however he returned to the office on 7/19/2023 complaining of a frenular adhesion that is bothersome when he has an erection. Patient has agreed to undergo incision of the frenulum with frenuloplasty as an outpatient. He understands risks of contracture infection bleeding further need for operative intervention possible urethral injury. The patient agrees to the procedure. He provided both verbal and signed informed consent. .    Review of Systems   All other systems reviewed and are negative. Historical Information   No past medical history on file. Past Surgical History:   Procedure Laterality Date   • MS CIRCUMCISION AGE >28 DAYS N/A 5/19/2023    Procedure: CIRCUMCISION ADULT;  Surgeon: Sulma Goodrich MD;  Location: [facility] OR;  Service: Urology     Social History   Social History     Substance and Sexual Activity   Alcohol Use Yes    Comment: social     Social History     Substance and Sexual Activity   Drug Use Never     Social History     Tobacco Use   Smoking Status Never   Smokeless Tobacco Never     Family History: No family history on file. Meds/Allergies   all medications and allergies reviewed  Allergies   Allergen Reactions   • Clindamycin Rash       Objective   Vitals: Blood pressure 102/70, pulse 56, height 6' 3" (1.905 m), weight 98 kg (216 lb), SpO2 100 %. [unfilled]    Invasive Devices     None                 Physical Exam  Vitals reviewed. Constitutional:       General: He is not in acute distress.      Appearance: Normal appearance. He is normal weight. He is not ill-appearing, toxic-appearing or diaphoretic. HENT:      Head: Normocephalic and atraumatic. Nose: Nose normal.      Mouth/Throat:      Mouth: Mucous membranes are moist.   Eyes:      Extraocular Movements: Extraocular movements intact. Cardiovascular:      Rate and Rhythm: Normal rate and regular rhythm. Heart sounds: Normal heart sounds. Pulmonary:      Effort: Pulmonary effort is normal. No respiratory distress. Breath sounds: Normal breath sounds. No stridor. No wheezing, rhonchi or rales. Chest:      Chest wall: No tenderness. Abdominal:      General: Abdomen is flat. Bowel sounds are normal. There is no distension. Palpations: Abdomen is soft. Tenderness: There is no abdominal tenderness. There is no guarding or rebound. Genitourinary:     Testes: Normal.      Comments: Circumcised phallus with normally placed patent meatus. There is a frenular adhesion ventrally in the midline between the glans penis and the mucosal aspect of the foreskin. This apparently bothers the patient during intercourse as he notes that this pulls with erection. Circumcision incision well-healed without tension or edema. Musculoskeletal:         General: Normal range of motion. Cervical back: Normal range of motion and neck supple. Skin:     General: Skin is dry. Neurological:      Mental Status: He is alert and oriented to person, place, and time. Psychiatric:         Mood and Affect: Mood normal.         Behavior: Behavior normal.         Thought Content: Thought content normal.         Judgment: Judgment normal.         Lab Results: I have personally reviewed pertinent reports. Imaging: I have personally reviewed pertinent reports. EKG, Pathology, and Other Studies: I have personally reviewed pertinent reports.     VTE Prophylaxis: Sequential compression device Freda Hocazra)     Code Status: [unfilled]  Advance Directive and Living Will:      Power of :    POLST:      Counseling / Coordination of Care  Total floor / unit time spent today 30 minutes. Greater than 50% of total time was spent with the patient and / or family counseling and / or coordination of care. A description of the counseling / coordination of care: Francisco Fermin

## 2023-07-19 NOTE — TELEPHONE ENCOUNTER
Patients wife calling in to speak to surgery scheduler regarding surgical procedure with Dr. Ry Hawkins. Patients wife requesting call back.      CB: [phone] ([address] [place] [address])           [phone] (patient)

## 2023-07-19 NOTE — H&P
H&P Exam - Urology   Rose Marie Jones 41 y.o. male MRN: [id number]  Unit/Bed#:  Encounter: [id number]    Assessment/Plan     Assessment:  Penile frenular adhesion  Plan:  Incision of penile frenulum/frenuloplasty    History of Present Illness   HPI:  Rose Marie Jones is a 41 y.o. male who presents with a history of phimosis and undergoing circumcision on 5/19/2023. The patient did well postoperatively with excellent healing of the circumcision however he returned to the office on 7/19/2023 complaining of a frenular adhesion that is bothersome when he has an erection. Patient has agreed to undergo incision of the frenulum with frenuloplasty as an outpatient. He understands risks of contracture infection bleeding further need for operative intervention possible urethral injury. The patient agrees to the procedure. He provided both verbal and signed informed consent. .    Review of Systems   All other systems reviewed and are negative. Historical Information   No past medical history on file. Past Surgical History:   Procedure Laterality Date   • AL CIRCUMCISION AGE >28 DAYS N/A 5/19/2023    Procedure: CIRCUMCISION ADULT;  Surgeon: Elliot Coker MD;  Location: [facility] OR;  Service: Urology     Social History   Social History     Substance and Sexual Activity   Alcohol Use Yes    Comment: social     Social History     Substance and Sexual Activity   Drug Use Never     Social History     Tobacco Use   Smoking Status Never   Smokeless Tobacco Never     Family History: No family history on file. Meds/Allergies   all medications and allergies reviewed  Allergies   Allergen Reactions   • Clindamycin Rash       Objective   Vitals: Blood pressure 102/70, pulse 56, height 6' 3" (1.905 m), weight 98 kg (216 lb), SpO2 100 %. [unfilled]    Invasive Devices     None                 Physical Exam  Vitals reviewed. Constitutional:       General: He is not in acute distress.      Appearance: Normal appearance. He is normal weight. He is not ill-appearing, toxic-appearing or diaphoretic. HENT:      Head: Normocephalic and atraumatic. Nose: Nose normal.      Mouth/Throat:      Mouth: Mucous membranes are moist.   Eyes:      Extraocular Movements: Extraocular movements intact. Cardiovascular:      Rate and Rhythm: Normal rate and regular rhythm. Heart sounds: Normal heart sounds. Pulmonary:      Effort: Pulmonary effort is normal. No respiratory distress. Breath sounds: Normal breath sounds. No stridor. No wheezing, rhonchi or rales. Chest:      Chest wall: No tenderness. Abdominal:      General: Abdomen is flat. Bowel sounds are normal. There is no distension. Palpations: Abdomen is soft. Tenderness: There is no abdominal tenderness. There is no guarding or rebound. Genitourinary:     Testes: Normal.      Comments: Circumcised phallus with normally placed patent meatus. There is a frenular adhesion ventrally in the midline between the glans penis and the mucosal aspect of the foreskin. This apparently bothers the patient during intercourse as he notes that this pulls with erection. Circumcision incision well-healed without tension or edema. Musculoskeletal:         General: Normal range of motion. Cervical back: Normal range of motion and neck supple. Skin:     General: Skin is dry. Neurological:      Mental Status: He is alert and oriented to person, place, and time. Psychiatric:         Mood and Affect: Mood normal.         Behavior: Behavior normal.         Thought Content: Thought content normal.         Judgment: Judgment normal.         Lab Results: I have personally reviewed pertinent reports. Imaging: I have personally reviewed pertinent reports. EKG, Pathology, and Other Studies: I have personally reviewed pertinent reports.     VTE Prophylaxis: Sequential compression device Katlyn Wynn     Code Status: [unfilled]  Advance Directive and Living Will:      Power of :    POLST:      Counseling / Coordination of Care  Total floor / unit time spent today 30 minutes. Greater than 50% of total time was spent with the patient and / or family counseling and / or coordination of care. A description of the counseling / coordination of care: Andrae Treviño

## 2023-07-19 NOTE — H&P (VIEW-ONLY)
H&P Exam - Urology   Edgar Lazcano 41 y.o. male MRN: [id number]  Unit/Bed#:  Encounter: [id number]    Assessment/Plan     Assessment:  Penile frenular adhesion  Plan:  Incision of penile frenulum/frenuloplasty    History of Present Illness   HPI:  Edgar Lazcano is a 41 y.o. male who presents with a history of phimosis and undergoing circumcision on 5/19/2023. The patient did well postoperatively with excellent healing of the circumcision however he returned to the office on 7/19/2023 complaining of a frenular adhesion that is bothersome when he has an erection. Patient has agreed to undergo incision of the frenulum with frenuloplasty as an outpatient. He understands risks of contracture infection bleeding further need for operative intervention possible urethral injury. The patient agrees to the procedure. He provided both verbal and signed informed consent. .    Review of Systems   All other systems reviewed and are negative. Historical Information   No past medical history on file. Past Surgical History:   Procedure Laterality Date   • MD CIRCUMCISION AGE >28 DAYS N/A 5/19/2023    Procedure: CIRCUMCISION ADULT;  Surgeon: Adalid Albarado MD;  Location: [facility] OR;  Service: Urology     Social History   Social History     Substance and Sexual Activity   Alcohol Use Yes    Comment: social     Social History     Substance and Sexual Activity   Drug Use Never     Social History     Tobacco Use   Smoking Status Never   Smokeless Tobacco Never     Family History: No family history on file. Meds/Allergies   all medications and allergies reviewed  Allergies   Allergen Reactions   • Clindamycin Rash       Objective   Vitals: Blood pressure 102/70, pulse 56, height 6' 3" (1.905 m), weight 98 kg (216 lb), SpO2 100 %. [unfilled]    Invasive Devices     None                 Physical Exam  Vitals reviewed. Constitutional:       General: He is not in acute distress.      Appearance: Normal appearance. He is normal weight. He is not ill-appearing, toxic-appearing or diaphoretic. HENT:      Head: Normocephalic and atraumatic. Nose: Nose normal.      Mouth/Throat:      Mouth: Mucous membranes are moist.   Eyes:      Extraocular Movements: Extraocular movements intact. Cardiovascular:      Rate and Rhythm: Normal rate and regular rhythm. Heart sounds: Normal heart sounds. Pulmonary:      Effort: Pulmonary effort is normal. No respiratory distress. Breath sounds: Normal breath sounds. No stridor. No wheezing, rhonchi or rales. Chest:      Chest wall: No tenderness. Abdominal:      General: Abdomen is flat. Bowel sounds are normal. There is no distension. Palpations: Abdomen is soft. Tenderness: There is no abdominal tenderness. There is no guarding or rebound. Genitourinary:     Testes: Normal.      Comments: Circumcised phallus with normally placed patent meatus. There is a frenular adhesion ventrally in the midline between the glans penis and the mucosal aspect of the foreskin. This apparently bothers the patient during intercourse as he notes that this pulls with erection. Circumcision incision well-healed without tension or edema. Musculoskeletal:         General: Normal range of motion. Cervical back: Normal range of motion and neck supple. Skin:     General: Skin is dry. Neurological:      Mental Status: He is alert and oriented to person, place, and time. Psychiatric:         Mood and Affect: Mood normal.         Behavior: Behavior normal.         Thought Content: Thought content normal.         Judgment: Judgment normal.         Lab Results: I have personally reviewed pertinent reports. Imaging: I have personally reviewed pertinent reports. EKG, Pathology, and Other Studies: I have personally reviewed pertinent reports.     VTE Prophylaxis: Sequential compression device Ava Anastasiya)     Code Status: [unfilled]  Advance Directive and Living Will:      Power of :    POLST:      Counseling / Coordination of Care  Total floor / unit time spent today 30 minutes. Greater than 50% of total time was spent with the patient and / or family counseling and / or coordination of care. A description of the counseling / coordination of care: Dorothea Fulton

## 2023-07-19 NOTE — LETTER
July 19, 2023     Dorita Villavicencio, [address] [facility]. [id number]    Patient: Petra Jesus   YOB: 1985   Date of Visit: 7/19/2023       Dear Dr. Archie De La Fuente: Thank you for referring Petra Jesus to me for evaluation. Below are my notes for this consultation. If you have questions, please do not hesitate to call me. I look forward to following your patient along with you. Sincerely,        Berto Gillespie MD        CC: No Recipients    Berto Gillespie MD  7/19/2023  8:48 AM  Sign when Signing Visit  H&P Exam - Urology   Petra Jesus 41 y.o. male MRN: [id number]  Unit/Bed#:  Encounter: [id number]    Assessment/Plan     Assessment:  Penile frenular adhesion  Plan:  Incision of penile frenulum/frenuloplasty    History of Present Illness   HPI:  Petra Jseus is a 41 y.o. male who presents with a history of phimosis and undergoing circumcision on 5/19/2023. The patient did well postoperatively with excellent healing of the circumcision however he returned to the office on 7/19/2023 complaining of a frenular adhesion that is bothersome when he has an erection. Patient has agreed to undergo incision of the frenulum with frenuloplasty as an outpatient. He understands risks of contracture infection bleeding further need for operative intervention possible urethral injury. The patient agrees to the procedure. He provided both verbal and signed informed consent. .    Review of Systems   All other systems reviewed and are negative. Historical Information   No past medical history on file.   Past Surgical History:   Procedure Laterality Date   • [place] CIRCUMCISION AGE >28 DAYS N/A 5/19/2023    Procedure: CIRCUMCISION ADULT;  Surgeon: Berto Gillespie MD;  Location[facility];  Service: Urology     Social History   Social History     Substance and Sexual Activity   Alcohol Use Yes    Comment: social     Social History     Substance and Sexual Activity Drug Use Never     Social History     Tobacco Use   Smoking Status Never   Smokeless Tobacco Never     Family History: No family history on file. Meds/Allergies   all medications and allergies reviewed  Allergies   Allergen Reactions   • Clindamycin Rash       Objective   Vitals: Blood pressure 102/70, pulse 56, height 6' 3" (1.905 m), weight 98 kg (216 lb), SpO2 100 %. [unfilled]    Invasive Devices       None                   Physical Exam  Vitals reviewed. Constitutional:       General: He is not in acute distress. Appearance: Normal appearance. He is normal weight. He is not ill-appearing, toxic-appearing or diaphoretic. HENT:      Head: Normocephalic and atraumatic. Nose: Nose normal.      Mouth/Throat:      Mouth: Mucous membranes are moist.   Eyes:      Extraocular Movements: Extraocular movements intact. Cardiovascular:      Rate and Rhythm: Normal rate and regular rhythm. Heart sounds: Normal heart sounds. Pulmonary:      Effort: Pulmonary effort is normal. No respiratory distress. Breath sounds: Normal breath sounds. No stridor. No wheezing, rhonchi or rales. Chest:      Chest wall: No tenderness. Abdominal:      General: Abdomen is flat. Bowel sounds are normal. There is no distension. Palpations: Abdomen is soft. Tenderness: There is no abdominal tenderness. There is no guarding or rebound. Genitourinary:     Testes: Normal.      Comments: Circumcised phallus with normally placed patent meatus. There is a frenular adhesion ventrally in the midline between the glans penis and the mucosal aspect of the foreskin. This apparently bothers the patient during intercourse as he notes that this pulls with erection. Circumcision incision well-healed without tension or edema. Musculoskeletal:         General: Normal range of motion. Cervical back: Normal range of motion and neck supple. Skin:     General: Skin is dry.    Neurological:      Mental Status: He is alert and oriented to person, place, and time. Psychiatric:         Mood and Affect: Mood normal.         Behavior: Behavior normal.         Thought Content: Thought content normal.         Judgment: Judgment normal.         Lab Results: I have personally reviewed pertinent reports. Imaging: I have personally reviewed pertinent reports. EKG, Pathology, and Other Studies: I have personally reviewed pertinent reports. VTE Prophylaxis: Sequential compression device Gage Shukla)     Code Status: [unfilled]  Advance Directive and Living Will:      Power of :    POLST:      Counseling / Coordination of Care  Total floor / unit time spent today 30 minutes. Greater than 50% of total time was spent with the patient and / or family counseling and / or coordination of care. A description of the counseling / coordination of care: Francisco Fermin

## 2023-07-20 NOTE — TELEPHONE ENCOUNTER
Patient's wife calling to speak to Axel Monday.     Informed her the SS will reach out within 5-7 business days     She can be reached at [phone] to schedule

## 2023-07-20 NOTE — TELEPHONE ENCOUNTER
I spoke to the patient and scheduled his procedure for 8/11/2023 at [facility]  with Dr. Meenakshi Gonzalez.    -instructions given verbally and mailed  -patient aware to be NPO, needs a    -CBC, CMP, Urine C&S  2 weeks prior  -PO 8/22/2023

## 2023-08-07 NOTE — PRE-PROCEDURE INSTRUCTIONS
Pre-Surgery Instructions:   Medication Instructions   • acetaminophen (TYLENOL) 650 mg CR tablet Uses PRN- OK to take day of surgery   See above   Reviewed with   . Lisa Haney Medication instructions for day surgery reviewed. Please use only a sip of water to take your instructed medications. Avoid all over the counter vitamins, supplements and NSAIDS for one week prior to surgery per anesthesia guidelines. Tylenol is ok to take as needed. You will receive a call one business day prior to surgery with an arrival time and hospital directions. If your surgery is scheduled on a Monday, the hospital will be calling you on the Friday prior to your surgery. If you have not heard from anyone by 8pm, please call the hospital supervisor through the hospital  at [phone]. Nils Patino [phone]). Do not eat or drink anything after midnight the night before your surgery, including candy, mints, lifesavers, or chewing gum. Do not drink alcohol 24hrs before your surgery. Try not to smoke at least 24hrs before your surgery. Follow the pre surgery showering instructions as listed in the [facility] Surgical Experience Booklet” or otherwise provided by your surgeon's office. Do not shave the surgical area 24 hours before surgery. Do not apply any lotions, creams, including makeup, cologne, deodorant, or perfumes after showering on the day of your surgery. No contact lenses, eye make-up, or artificial eyelashes. Remove nail polish, including gel polish, and any artificial, gel, or acrylic nails if possible. Remove all jewelry including rings and body piercing jewelry. Wear causal clothing that is easy to take on and off. Consider your type of surgery. Keep any valuables, jewelry, piercings at home. Please bring any specially ordered equipment (sling, braces) if indicated. Arrange for a responsible person to drive you to and from the hospital on the day of your surgery.  Visitor Guidelines discussed. Call the surgeon's office with any new illnesses, exposures, or additional questions prior to surgery. Please reference your [facility] Surgical Experience Booklet” for additional information to prepare for your upcoming surgery.

## 2023-08-09 ENCOUNTER — APPOINTMENT (OUTPATIENT)
Dept: LAB | Facility: CLINIC | Age: 38
End: 2023-08-09
Payer: COMMERCIAL

## 2023-08-09 DIAGNOSIS — Q55.69 CONGENITAL PENILE ADHESIONS: ICD-10-CM

## 2023-08-09 LAB
ALBUMIN SERPL BCP-MCNC: 3.7 G/DL (ref 3.5–5)
ALP SERPL-CCNC: 54 U/L (ref 46–116)
ALT SERPL W P-5'-P-CCNC: 35 U/L (ref 12–78)
ANION GAP SERPL CALCULATED.3IONS-SCNC: 4 MMOL/L
AST SERPL W P-5'-P-CCNC: 22 U/L (ref 5–45)
BASOPHILS # BLD AUTO: 0.05 THOUSANDS/ÂΜL (ref 0–0.1)
BASOPHILS NFR BLD AUTO: 1 % (ref 0–1)
BILIRUB SERPL-MCNC: 2.04 MG/DL (ref 0.2–1)
BUN SERPL-MCNC: 16 MG/DL (ref 5–25)
CALCIUM SERPL-MCNC: 8.8 MG/DL (ref 8.3–10.1)
CHLORIDE SERPL-SCNC: 110 MMOL/L (ref 96–108)
CO2 SERPL-SCNC: 26 MMOL/L (ref 21–32)
CREAT SERPL-MCNC: 1.3 MG/DL (ref 0.6–1.3)
EOSINOPHIL # BLD AUTO: 0.16 THOUSAND/ÂΜL (ref 0–0.61)
EOSINOPHIL NFR BLD AUTO: 2 % (ref 0–6)
ERYTHROCYTE [DISTWIDTH] IN BLOOD BY AUTOMATED COUNT: 13.1 % (ref 11.6–15.1)
GFR SERPL CREATININE-BSD FRML MDRD: 70 ML/MIN/1.73SQ M
GLUCOSE P FAST SERPL-MCNC: 82 MG/DL (ref 65–99)
HCT VFR BLD AUTO: 49.2 % (ref 36.5–49.3)
HGB BLD-MCNC: 15.3 G/DL (ref 12–17)
IMM GRANULOCYTES # BLD AUTO: 0.03 THOUSAND/UL (ref 0–0.2)
IMM GRANULOCYTES NFR BLD AUTO: 0 % (ref 0–2)
LYMPHOCYTES # BLD AUTO: 2.74 THOUSANDS/ÂΜL (ref 0.6–4.47)
LYMPHOCYTES NFR BLD AUTO: 37 % (ref 14–44)
MCH RBC QN AUTO: 27.4 PG (ref 26.8–34.3)
MCHC RBC AUTO-ENTMCNC: 31.1 G/DL (ref 31.4–37.4)
MCV RBC AUTO: 88 FL (ref 82–98)
MONOCYTES # BLD AUTO: 0.54 THOUSAND/ÂΜL (ref 0.17–1.22)
MONOCYTES NFR BLD AUTO: 7 % (ref 4–12)
NEUTROPHILS # BLD AUTO: 3.9 THOUSANDS/ÂΜL (ref 1.85–7.62)
NEUTS SEG NFR BLD AUTO: 53 % (ref 43–75)
NRBC BLD AUTO-RTO: 0 /100 WBCS
PLATELET # BLD AUTO: 249 THOUSANDS/UL (ref 149–390)
PMV BLD AUTO: 13.4 FL (ref 8.9–12.7)
POTASSIUM SERPL-SCNC: 4.3 MMOL/L (ref 3.5–5.3)
PROT SERPL-MCNC: 7.5 G/DL (ref 6.4–8.4)
RBC # BLD AUTO: 5.58 MILLION/UL (ref 3.88–5.62)
SODIUM SERPL-SCNC: 140 MMOL/L (ref 135–147)
WBC # BLD AUTO: 7.42 THOUSAND/UL (ref 4.31–10.16)

## 2023-08-09 PROCEDURE — 87086 URINE CULTURE/COLONY COUNT: CPT

## 2023-08-09 PROCEDURE — 36415 COLL VENOUS BLD VENIPUNCTURE: CPT

## 2023-08-09 PROCEDURE — 85025 COMPLETE CBC W/AUTO DIFF WBC: CPT

## 2023-08-09 PROCEDURE — 80053 COMPREHEN METABOLIC PANEL: CPT

## 2023-08-10 ENCOUNTER — ANESTHESIA EVENT (OUTPATIENT)
Dept: PERIOP | Facility: HOSPITAL | Age: 38
End: 2023-08-10
Payer: COMMERCIAL

## 2023-08-10 LAB — BACTERIA UR CULT: NORMAL

## 2023-08-11 ENCOUNTER — HOSPITAL ENCOUNTER (OUTPATIENT)
Facility: HOSPITAL | Age: 38
Setting detail: OUTPATIENT SURGERY
Discharge: HOME | End: 2023-08-11
Attending: UROLOGY | Admitting: UROLOGY
Payer: COMMERCIAL

## 2023-08-11 ENCOUNTER — TELEPHONE (OUTPATIENT)
Dept: UROLOGY | Facility: MEDICAL CENTER | Age: 38
End: 2023-08-11

## 2023-08-11 ENCOUNTER — ANESTHESIA (OUTPATIENT)
Dept: PERIOP | Facility: HOSPITAL | Age: 38
End: 2023-08-11
Payer: COMMERCIAL

## 2023-08-11 VITALS
DIASTOLIC BLOOD PRESSURE: 69 MMHG | HEART RATE: 60 BPM | TEMPERATURE: 97 F | SYSTOLIC BLOOD PRESSURE: 120 MMHG | OXYGEN SATURATION: 100 % | RESPIRATION RATE: 18 BRPM

## 2023-08-11 PROCEDURE — 54164 FRENULOTOMY OF PENIS: CPT | Performed by: UROLOGY

## 2023-08-11 RX ORDER — SODIUM CHLORIDE, SODIUM LACTATE, POTASSIUM CHLORIDE, CALCIUM CHLORIDE 600; 310; 30; 20 MG/100ML; MG/100ML; MG/100ML; MG/100ML
INJECTION, SOLUTION INTRAVENOUS CONTINUOUS PRN
Status: DISCONTINUED | OUTPATIENT
Start: 2023-08-11 | End: 2023-08-11

## 2023-08-11 RX ORDER — ONDANSETRON 2 MG/ML
4 INJECTION INTRAMUSCULAR; INTRAVENOUS ONCE AS NEEDED
Status: DISCONTINUED | OUTPATIENT
Start: 2023-08-11 | End: 2023-08-11 | Stop reason: HOSPADM

## 2023-08-11 RX ORDER — ONDANSETRON 2 MG/ML
INJECTION INTRAMUSCULAR; INTRAVENOUS AS NEEDED
Status: DISCONTINUED | OUTPATIENT
Start: 2023-08-11 | End: 2023-08-11

## 2023-08-11 RX ORDER — LIDOCAINE HYDROCHLORIDE 10 MG/ML
INJECTION, SOLUTION EPIDURAL; INFILTRATION; INTRACAUDAL; PERINEURAL AS NEEDED
Status: DISCONTINUED | OUTPATIENT
Start: 2023-08-11 | End: 2023-08-11

## 2023-08-11 RX ORDER — FENTANYL CITRATE 50 UG/ML
INJECTION, SOLUTION INTRAMUSCULAR; INTRAVENOUS AS NEEDED
Status: DISCONTINUED | OUTPATIENT
Start: 2023-08-11 | End: 2023-08-11

## 2023-08-11 RX ORDER — DEXAMETHASONE SODIUM PHOSPHATE 10 MG/ML
INJECTION, SOLUTION INTRAMUSCULAR; INTRAVENOUS AS NEEDED
Status: DISCONTINUED | OUTPATIENT
Start: 2023-08-11 | End: 2023-08-11

## 2023-08-11 RX ORDER — CEFAZOLIN SODIUM 2 G/50ML
2000 SOLUTION INTRAVENOUS ONCE
Status: COMPLETED | OUTPATIENT
Start: 2023-08-11 | End: 2023-08-11

## 2023-08-11 RX ORDER — HYDROCODONE BITARTRATE AND ACETAMINOPHEN 5; 325 MG/1; MG/1
1 TABLET ORAL EVERY 6 HOURS PRN
Status: DISCONTINUED | OUTPATIENT
Start: 2023-08-11 | End: 2023-08-11 | Stop reason: HOSPADM

## 2023-08-11 RX ORDER — FENTANYL CITRATE/PF 50 MCG/ML
25 SYRINGE (ML) INJECTION
Status: DISCONTINUED | OUTPATIENT
Start: 2023-08-11 | End: 2023-08-11 | Stop reason: HOSPADM

## 2023-08-11 RX ORDER — HYDROMORPHONE HCL IN WATER/PF 6 MG/30 ML
0.2 PATIENT CONTROLLED ANALGESIA SYRINGE INTRAVENOUS
Status: DISCONTINUED | OUTPATIENT
Start: 2023-08-11 | End: 2023-08-11 | Stop reason: HOSPADM

## 2023-08-11 RX ORDER — SODIUM CHLORIDE, SODIUM LACTATE, POTASSIUM CHLORIDE, CALCIUM CHLORIDE 600; 310; 30; 20 MG/100ML; MG/100ML; MG/100ML; MG/100ML
125 INJECTION, SOLUTION INTRAVENOUS CONTINUOUS
Status: DISCONTINUED | OUTPATIENT
Start: 2023-08-11 | End: 2023-08-11 | Stop reason: HOSPADM

## 2023-08-11 RX ORDER — MAGNESIUM HYDROXIDE 1200 MG/15ML
LIQUID ORAL AS NEEDED
Status: DISCONTINUED | OUTPATIENT
Start: 2023-08-11 | End: 2023-08-11 | Stop reason: HOSPADM

## 2023-08-11 RX ORDER — MIDAZOLAM HYDROCHLORIDE 2 MG/2ML
INJECTION, SOLUTION INTRAMUSCULAR; INTRAVENOUS AS NEEDED
Status: DISCONTINUED | OUTPATIENT
Start: 2023-08-11 | End: 2023-08-11

## 2023-08-11 RX ORDER — PROPOFOL 10 MG/ML
INJECTION, EMULSION INTRAVENOUS AS NEEDED
Status: DISCONTINUED | OUTPATIENT
Start: 2023-08-11 | End: 2023-08-11

## 2023-08-11 RX ADMIN — PROPOFOL 200 MG: 10 INJECTION, EMULSION INTRAVENOUS at 07:28

## 2023-08-11 RX ADMIN — PROPOFOL 100 MG: 10 INJECTION, EMULSION INTRAVENOUS at 07:29

## 2023-08-11 RX ADMIN — MIDAZOLAM HYDROCHLORIDE 2 MG: 1 INJECTION, SOLUTION INTRAMUSCULAR; INTRAVENOUS at 07:23

## 2023-08-11 RX ADMIN — CEFAZOLIN SODIUM 2000 MG: 2 SOLUTION INTRAVENOUS at 07:31

## 2023-08-11 RX ADMIN — FENTANYL CITRATE 25 MCG: 50 INJECTION, SOLUTION INTRAMUSCULAR; INTRAVENOUS at 07:28

## 2023-08-11 RX ADMIN — FENTANYL CITRATE 25 MCG: 50 INJECTION, SOLUTION INTRAMUSCULAR; INTRAVENOUS at 08:37

## 2023-08-11 RX ADMIN — DEXAMETHASONE SODIUM PHOSPHATE 10 MG: 10 INJECTION, SOLUTION INTRAMUSCULAR; INTRAVENOUS at 07:31

## 2023-08-11 RX ADMIN — LIDOCAINE HYDROCHLORIDE 50 MG: 10 INJECTION, SOLUTION EPIDURAL; INFILTRATION; INTRACAUDAL; PERINEURAL at 07:28

## 2023-08-11 RX ADMIN — FENTANYL CITRATE 25 MCG: 50 INJECTION, SOLUTION INTRAMUSCULAR; INTRAVENOUS at 08:42

## 2023-08-11 RX ADMIN — ONDANSETRON 4 MG: 2 INJECTION INTRAMUSCULAR; INTRAVENOUS at 07:31

## 2023-08-11 RX ADMIN — SODIUM CHLORIDE, SODIUM LACTATE, POTASSIUM CHLORIDE, AND CALCIUM CHLORIDE: .6; .31; .03; .02 INJECTION, SOLUTION INTRAVENOUS at 07:54

## 2023-08-11 RX ADMIN — SODIUM CHLORIDE, SODIUM LACTATE, POTASSIUM CHLORIDE, AND CALCIUM CHLORIDE: .6; .31; .03; .02 INJECTION, SOLUTION INTRAVENOUS at 07:23

## 2023-08-11 RX ADMIN — PROPOFOL 50 MG: 10 INJECTION, EMULSION INTRAVENOUS at 07:37

## 2023-08-11 RX ADMIN — FENTANYL CITRATE 50 MCG: 50 INJECTION, SOLUTION INTRAMUSCULAR; INTRAVENOUS at 07:33

## 2023-08-11 NOTE — ANESTHESIA POSTPROCEDURE EVALUATION
Post-Op Assessment Note    CV Status:  Stable    Pain management: satisfactory to patient     Mental Status:  Sleepy   Hydration Status:  Stable   PONV Controlled:  None   Airway Patency:  Patent      Post Op Vitals Reviewed: Yes      Staff: CRNA         No notable events documented.     BP   120/60   Temp  97   Pulse  60   Resp   14   SpO2   100

## 2023-08-11 NOTE — OP NOTE
OPERATIVE REPORT  PATIENT NAME: Brandy Chaudhari    :  1985  MRN: [id number]  Pt Location:  OR ROOM 08    SURGERY DATE: 2023    Surgeon(s) and Role:     * Spike Jimenez MD - Primary    Preop Diagnosis:  Penile frenular adhesion     Postoperative diagnosis  Penile frenular adhesion    Procedure(s):  Incision of penile frenulum. frenuloplasty (penile)    Specimen(s):  * No specimens in log *    Estimated Blood Loss:   Minimal    Drains:  * No LDAs found *    Anesthesia Type:   General    Operative Indications:  Penile frenular adhesion with ventral traction on the glans penis during with erection    Operative Findings:  Penile frenular adhesion    Complications:   None    Procedure and Technique: I saw the patient in the holding area performed history and physical described the procedure step-by-step as proposed discussed potential risks and complications and answered all patient questions. The patient reaffirmed previously signed informed consent verbally. He was taken to the operating room identified by the surgeon prepped and draped in usual sterile fashion in the supine position after appropriate timeout and induction of general LMA anesthesia. Using a 15 scalpel blade the penile frenular adhesion located in the coronal sulcus of the penis in the midline ventrally was divided in a transverse fashion. Electrocautery was used to maintain hemostasis. After the division of the penile frenulum there was no longer any further contracture created between the penile shaft skin and the glans penis. The transverse frenular incision was then closed in a longitudinal fashion using interrupted 4-0 chromic simple sutures. There were no complications. Sterile dressings were then applied and the patient was extubated and transferred to the PACU and later discharged in good condition. There were no complications.   The patient understands postoperative local care instructions and will return for follow-up in 1 to 2 weeks for wound check and discussion of results. I was present for the entire procedure. and I was present for all critical portions of the procedure.     Patient Disposition:  PACU         SIGNATURE: Pedro Pearce MD  DATE: August 11, 2023  TIME: 7:56 AM

## 2023-08-11 NOTE — TELEPHONE ENCOUNTER
Patient of Dr. Luisana Rosas at [facility]    Patient 's wife called stating patient had procedure done today by Dr. Luisana Rosas and he needs a note for work stating when he is able to return to work . He wants to know if he can return on Monday . He will need to  note today.       Patient's wife can be reached at [phone]

## 2023-08-11 NOTE — INTERVAL H&P NOTE
H&P reviewed. After examining the patient I find no changes in the patients condition since the H&P had been written.     Vitals:    08/11/23 0630   BP: 120/75   Pulse: 55   Resp: 18   Temp: (!) 97.1 °F (36.2 °C)   SpO2: 98%

## 2023-08-11 NOTE — ANESTHESIA PREPROCEDURE EVALUATION
Procedure: Incision of penile frenulum, frenuloplasty (penile) (Penis)    Relevant Problems   MUSCULOSKELETAL   (+) Chronic midline low back pain without sciatica      NEURO/PSYCH   (+) Chronic midline low back pain without sciatica      Other   (+) Overweight (BMI 25.0-29. 9)        Physical Exam    Airway    Mallampati score: II  TM Distance: >3 FB  Neck ROM: full     Dental   No notable dental hx     Cardiovascular      Pulmonary      Other Findings        Anesthesia Plan  ASA Score- 1     Anesthesia Type- general with ASA Monitors. Additional Monitors:   Airway Plan: LMA. Plan Factors-Exercise tolerance (METS): >4 METS. Chart reviewed. Patient summary reviewed. Patient is not a current smoker. Patient did not smoke on day of surgery. Induction- intravenous. Postoperative Plan- Plan for postoperative opioid use. Informed Consent- Anesthetic plan and risks discussed with patient. I personally reviewed this patient with the CRNA. Discussed and agreed on the Anesthesia Plan with the CRNA. Inocencia Alex

## 2023-08-14 ENCOUNTER — TELEPHONE (OUTPATIENT)
Dept: UROLOGY | Facility: MEDICAL CENTER | Age: 38
End: 2023-08-14

## 2023-08-14 NOTE — TELEPHONE ENCOUNTER
Post Op Note    Lai Prakash is a 41 y.o. male s/p Incision of penile frenulum, frenuloplasty (penile) (Penis) performed 8/11/23 with Dr. Dimitrios Jones. How would you rate your pain on a scale from 1 to 10, 10 being the worst pain ever? 0  Have you had a fever? No    Have your bowel movements been regular? Yes  Do you have any difficulty urinating? No  If the patient has an incision- do you have any redness around the incision or any drainage from the incision? No  If the patient has a drain- are you having any issues with the drain? No drain   If the patient has a genao- are you comfortable caring for your genao? No genao   Do you have any other questions or concerns that I can address at this time? Pt states he is doing well with no issues or concerns. He is aware he has a fu appt for wound check on 8/29/23 at [address] [place] [address].

## 2023-09-05 ENCOUNTER — OFFICE VISIT (OUTPATIENT)
Dept: FAMILY MEDICINE CLINIC | Facility: CLINIC | Age: 38
End: 2023-09-05

## 2023-09-05 VITALS
OXYGEN SATURATION: 99 % | HEART RATE: 62 BPM | RESPIRATION RATE: 16 BRPM | WEIGHT: 236 LBS | SYSTOLIC BLOOD PRESSURE: 124 MMHG | HEIGHT: 75 IN | TEMPERATURE: 98.7 F | BODY MASS INDEX: 29.34 KG/M2 | DIASTOLIC BLOOD PRESSURE: 80 MMHG

## 2023-09-05 DIAGNOSIS — L01.00 IMPETIGO: ICD-10-CM

## 2023-09-05 DIAGNOSIS — L23.7 POISON IVY DERMATITIS: Primary | ICD-10-CM

## 2023-09-05 PROCEDURE — 99214 OFFICE O/P EST MOD 30 MIN: CPT | Performed by: FAMILY MEDICINE

## 2023-09-05 RX ORDER — TRIAMCINOLONE ACETONIDE 5 MG/G
CREAM TOPICAL 2 TIMES DAILY
Qty: 15 G | Refills: 0 | Status: SHIPPED | OUTPATIENT
Start: 2023-09-05 | End: 2023-09-19

## 2023-09-05 NOTE — PROGRESS NOTES
Name: Lanre Farooq      : 1985      MRN: [id number]  Encounter Provider: Urszula Orozco MD  Encounter Date: 2023   Encounter department: [facility]     1. Poison ivy dermatitis  -     triamcinolone (KENALOG) 0.5 % cream; Apply topically 2 (two) times a day for 14 days    2. Impetigo  -     mupirocin (BACTROBAN) 2 % ointment; Apply topically 3 (three) times a day for 5 days           Subjective      51-year-old male with no significant past medical history who presents today with a few day onset of rash involving bilateral lower and upper extremity. He reports that rash involving left distal forearm right, right antecubital Fossa, and bilateral lower extremity ankles area. He reports that the rash is pruritic. He takes Benadryl we gave him some relief. He denies using any new creams, detergents or lotion. He does report that he may have been exposed to poison ivy. He reports that no one else is affected in his household with similar rash. Review of Systems   Constitutional: Negative for chills, diaphoresis, fatigue and fever. Respiratory: Negative for cough, shortness of breath and wheezing. Cardiovascular: Negative for chest pain, palpitations and leg swelling. Gastrointestinal: Negative for abdominal pain, nausea and vomiting. Musculoskeletal: Negative for arthralgias. Skin: Positive for rash. Neurological: Negative for syncope, weakness and light-headedness. Psychiatric/Behavioral: Negative for behavioral problems.        Current Outpatient Medications on File Prior to Visit   Medication Sig   • acetaminophen (TYLENOL) 650 mg CR tablet Take 1 tablet (650 mg total) by mouth every 8 (eight) hours as needed for mild pain       Objective     /80 (BP Location: Right arm, Patient Position: Sitting, Cuff Size: Large)   Pulse 62   Temp 98.7 °F (37.1 °C) (Temporal)   Resp 16   Ht 6' 3" (1.905 m)   Wt 107 kg (236 lb)   SpO2 99%   BMI 29.50 kg/m²     Physical Exam  Constitutional:       General: He is not in acute distress. Appearance: Normal appearance. He is well-developed. He is not ill-appearing, toxic-appearing or diaphoretic. HENT:      Head: Normocephalic and atraumatic. Right Ear: External ear normal.      Left Ear: External ear normal.      Nose: Nose normal.      Mouth/Throat:      Pharynx: No oropharyngeal exudate. Eyes:      General: No scleral icterus. Right eye: No discharge. Left eye: No discharge. Conjunctiva/sclera: Conjunctivae normal.      Pupils: Pupils are equal, round, and reactive to light. Cardiovascular:      Rate and Rhythm: Normal rate and regular rhythm. Heart sounds: Normal heart sounds. No murmur heard. No friction rub. No gallop. Pulmonary:      Effort: Pulmonary effort is normal. No respiratory distress. Breath sounds: Normal breath sounds. No stridor. No wheezing. Abdominal:      General: Bowel sounds are normal. There is no distension. Palpations: Abdomen is soft. There is no mass. Tenderness: There is no abdominal tenderness. There is no guarding. Musculoskeletal:         General: No deformity. Normal range of motion. Cervical back: Normal range of motion and neck supple. Skin:     General: Skin is warm. Capillary Refill: Capillary refill takes less than 2 seconds. Comments: -Colored crusted lesion along left distal forearm with some erythema  -Mildly erythematous papular rash involving right antecubital fossa lower extremity. Neurological:      Mental Status: He is alert and oriented to person, place, and time. Cranial Nerves: No cranial nerve deficit.        Kasia Nguyen MD

## (undated) DEVICE — INTENDED FOR TISSUE SEPARATION, AND OTHER PROCEDURES THAT REQUIRE A SHARP SURGICAL BLADE TO PUNCTURE OR CUT.: Brand: BARD-PARKER SAFETY BLADES SIZE 15, STERILE

## (undated) DEVICE — BASIC SINGLE BASIN-LF: Brand: MEDLINE INDUSTRIES, INC.

## (undated) DEVICE — DRESSING XEROFORM 5 X 9

## (undated) DEVICE — STERILE POLYISOPRENE POWDER-FREE SURGICAL GLOVES: Brand: PROTEXIS

## (undated) DEVICE — ELECTRODE NEEDLE MEGAFINE 2IN E-Z CLEAN MEGADYNE -0118

## (undated) DEVICE — SYRINGE 10ML LL CONTROL TOP

## (undated) DEVICE — COTTON TIP APPLICTOR 2 PK

## (undated) DEVICE — LIGHT GLOVE GREEN

## (undated) DEVICE — SUT CHROMIC 5-0 P-3 18 IN 687G

## (undated) DEVICE — GAUZE SPONGES,16 PLY: Brand: CURITY

## (undated) DEVICE — BASIC PACK: Brand: CONVERTORS

## (undated) DEVICE — BETHLEHEM UNIVERSAL MINOR GEN: Brand: CARDINAL HEALTH

## (undated) DEVICE — SUT CHROMIC 4-0 PS-2 18 IN 1637G

## (undated) DEVICE — DISPOSABLE OR TOWEL: Brand: CARDINAL HEALTH

## (undated) DEVICE — SYRINGE 30ML LL

## (undated) DEVICE — NEEDLE BLUNT 18 G X 1 1/2IN

## (undated) DEVICE — PENCIL ELECTROSURG E-Z CLEAN -0035H

## (undated) DEVICE — 1820 FOAM BLOCK NEEDLE COUNTER: Brand: DEVON

## (undated) DEVICE — SPONGE STICK WITH PVP-I: Brand: KENDALL

## (undated) DEVICE — NEEDLE 25G X 1 1/2

## (undated) DEVICE — SKIN MARKER DUAL TIP WITH RULER CAP, FLEXIBLE RULER AND LABELS: Brand: DEVON

## (undated) DEVICE — CAUTERY TIP POLISHER: Brand: DEVON

## (undated) DEVICE — ELECTRODE NEEDLE MOD E-Z CLEAN 2.75IN 7CM -0013M

## (undated) DEVICE — SCD SEQUENTIAL COMPRESSION COMFORT SLEEVE MEDIUM KNEE LENGTH: Brand: KENDALL SCD

## (undated) DEVICE — MINOR PROCEDURE DRAPE: Brand: CONVERTORS

## (undated) DEVICE — DRAPE EQUIPMENT RF WAND

## (undated) DEVICE — GLOVE SRG BIOGEL 7.5

## (undated) DEVICE — SPONGE 4 X 4 XRAY 16 PLY STRL LF RFD

## (undated) DEVICE — SUT CHROMIC 3-0 PS-2 27 1638H

## (undated) DEVICE — SINGLE PORT MANIFOLD: Brand: NEPTUNE 2